# Patient Record
Sex: MALE | Race: WHITE | NOT HISPANIC OR LATINO | Employment: OTHER | URBAN - METROPOLITAN AREA
[De-identification: names, ages, dates, MRNs, and addresses within clinical notes are randomized per-mention and may not be internally consistent; named-entity substitution may affect disease eponyms.]

---

## 2017-05-02 ENCOUNTER — OFFICE VISIT - RIVER FALLS (OUTPATIENT)
Dept: FAMILY MEDICINE | Facility: CLINIC | Age: 52
End: 2017-05-02

## 2017-05-02 ASSESSMENT — MIFFLIN-ST. JEOR: SCORE: 2033.75

## 2018-06-13 ENCOUNTER — OFFICE VISIT - RIVER FALLS (OUTPATIENT)
Dept: FAMILY MEDICINE | Facility: CLINIC | Age: 53
End: 2018-06-13

## 2018-06-13 ASSESSMENT — MIFFLIN-ST. JEOR: SCORE: 1761.59

## 2019-02-14 ENCOUNTER — OFFICE VISIT - RIVER FALLS (OUTPATIENT)
Dept: FAMILY MEDICINE | Facility: CLINIC | Age: 54
End: 2019-02-14

## 2019-02-14 ASSESSMENT — MIFFLIN-ST. JEOR: SCORE: 1648.19

## 2019-02-15 LAB
BUN SERPL-MCNC: 20 MG/DL (ref 7–25)
BUN/CREAT RATIO - HISTORICAL: ABNORMAL (ref 6–22)
CALCIUM SERPL-MCNC: 9.7 MG/DL (ref 8.6–10.3)
CHLORIDE BLD-SCNC: 86 MMOL/L (ref 98–110)
CO2 SERPL-SCNC: 25 MMOL/L (ref 20–32)
CREAT SERPL-MCNC: 0.99 MG/DL (ref 0.7–1.33)
EGFRCR SERPLBLD CKD-EPI 2021: 87 ML/MIN/1.73M2
ERYTHROCYTE [DISTWIDTH] IN BLOOD BY AUTOMATED COUNT: 12.8 % (ref 11–15)
GLUCOSE BLD-MCNC: 757 MG/DL (ref 65–99)
HCT VFR BLD AUTO: 46.1 % (ref 38.5–50)
HGB BLD-MCNC: 15.3 GM/DL (ref 13.2–17.1)
LDLC SERPL CALC-MCNC: 207 MG/DL
MCH RBC QN AUTO: 32.8 PG (ref 27–33)
MCHC RBC AUTO-ENTMCNC: 33.2 GM/DL (ref 32–36)
MCV RBC AUTO: 98.9 FL (ref 80–100)
PLATELET # BLD AUTO: 176 10*3/UL (ref 140–400)
PMV BLD: 11.6 FL (ref 7.5–12.5)
POTASSIUM BLD-SCNC: 4.1 MMOL/L (ref 3.5–5.3)
RBC # BLD AUTO: 4.66 10*6/UL (ref 4.2–5.8)
SODIUM SERPL-SCNC: 126 MMOL/L (ref 135–146)
TSH SERPL DL<=0.005 MIU/L-ACNC: 2.89 MIU/L (ref 0.4–4.5)
WBC # BLD AUTO: 5.2 10*3/UL (ref 3.8–10.8)

## 2019-02-19 ENCOUNTER — OFFICE VISIT - RIVER FALLS (OUTPATIENT)
Dept: FAMILY MEDICINE | Facility: CLINIC | Age: 54
End: 2019-02-19

## 2019-02-19 LAB — GLUCOSE BLDC GLUCOMTR-MCNC: 269 MG/DL

## 2019-02-19 ASSESSMENT — MIFFLIN-ST. JEOR: SCORE: 1648.87

## 2019-02-20 LAB
C PEPTIDE: 0.82 NG/ML (ref 0.8–3.85)
CREAT UR-MCNC: 31 MG/DL (ref 20–320)
HBA1C MFR BLD: >14 %
MICROALBUMIN UR-MCNC: 4.3 MG/DL
MICROALBUMIN/CREAT UR: 139 MG/G{CREAT}

## 2019-02-26 ENCOUNTER — OFFICE VISIT - RIVER FALLS (OUTPATIENT)
Dept: FAMILY MEDICINE | Facility: CLINIC | Age: 54
End: 2019-02-26

## 2019-02-26 ASSESSMENT — MIFFLIN-ST. JEOR: SCORE: 1664.29

## 2019-03-01 LAB
GLUTAMIC ACID DECARBOXYLASE ANTIBODY: <5 IU/ML
IA-2 ANTIBODY: <5.4 UNIT/ML
Lab: <0.4 UNIT/ML

## 2019-04-09 ENCOUNTER — OFFICE VISIT - RIVER FALLS (OUTPATIENT)
Dept: FAMILY MEDICINE | Facility: CLINIC | Age: 54
End: 2019-04-09

## 2019-04-09 ASSESSMENT — MIFFLIN-ST. JEOR: SCORE: 1656.13

## 2019-05-03 ENCOUNTER — COMMUNICATION - RIVER FALLS (OUTPATIENT)
Dept: FAMILY MEDICINE | Facility: CLINIC | Age: 54
End: 2019-05-03

## 2019-05-03 ENCOUNTER — OFFICE VISIT - RIVER FALLS (OUTPATIENT)
Dept: FAMILY MEDICINE | Facility: CLINIC | Age: 54
End: 2019-05-03

## 2019-06-06 ENCOUNTER — COMMUNICATION - RIVER FALLS (OUTPATIENT)
Dept: FAMILY MEDICINE | Facility: CLINIC | Age: 54
End: 2019-06-06

## 2019-06-17 ENCOUNTER — AMBULATORY - RIVER FALLS (OUTPATIENT)
Dept: FAMILY MEDICINE | Facility: CLINIC | Age: 54
End: 2019-06-17

## 2019-06-18 ENCOUNTER — COMMUNICATION - RIVER FALLS (OUTPATIENT)
Dept: FAMILY MEDICINE | Facility: CLINIC | Age: 54
End: 2019-06-18

## 2019-06-18 LAB — HBA1C MFR BLD: 5.8 %

## 2019-06-26 ENCOUNTER — OFFICE VISIT - RIVER FALLS (OUTPATIENT)
Dept: FAMILY MEDICINE | Facility: CLINIC | Age: 54
End: 2019-06-26

## 2019-07-30 ENCOUNTER — OFFICE VISIT - RIVER FALLS (OUTPATIENT)
Dept: FAMILY MEDICINE | Facility: CLINIC | Age: 54
End: 2019-07-30

## 2019-07-30 ASSESSMENT — MIFFLIN-ST. JEOR: SCORE: 1644.34

## 2020-02-12 ENCOUNTER — OFFICE VISIT - RIVER FALLS (OUTPATIENT)
Dept: FAMILY MEDICINE | Facility: CLINIC | Age: 55
End: 2020-02-12

## 2020-02-12 ASSESSMENT — MIFFLIN-ST. JEOR: SCORE: 1748.66

## 2020-02-13 LAB
A/G RATIO - HISTORICAL: 2.5 (ref 1–2.5)
ALBUMIN SERPL-MCNC: 4.9 GM/DL (ref 3.6–5.1)
ALP SERPL-CCNC: 95 UNIT/L (ref 35–144)
ALT SERPL W P-5'-P-CCNC: 57 UNIT/L (ref 9–46)
AST SERPL W P-5'-P-CCNC: 25 UNIT/L (ref 10–35)
BASOPHILS # BLD MANUAL: 53 10*3/UL (ref 0–200)
BASOPHILS NFR BLD MANUAL: 0.6 %
BILIRUB DIRECT SERPL-MCNC: 0.1 MG/DL
BILIRUB INDIRECT SERPL-MCNC: 0.3 MG/DL (ref 0.2–1.2)
BILIRUB SERPL-MCNC: 0.4 MG/DL (ref 0.2–1.2)
BUN SERPL-MCNC: 21 MG/DL (ref 7–25)
BUN/CREAT RATIO - HISTORICAL: ABNORMAL (ref 6–22)
CALCIUM SERPL-MCNC: 10.4 MG/DL (ref 8.6–10.3)
CHLORIDE BLD-SCNC: 104 MMOL/L (ref 98–110)
CO2 SERPL-SCNC: 26 MMOL/L (ref 20–32)
CREAT SERPL-MCNC: 0.97 MG/DL (ref 0.7–1.33)
EGFRCR SERPLBLD CKD-EPI 2021: 88 ML/MIN/1.73M2
EOSINOPHIL # BLD MANUAL: 361 10*3/UL (ref 15–500)
EOSINOPHIL NFR BLD MANUAL: 4.1 %
ERYTHROCYTE [DISTWIDTH] IN BLOOD BY AUTOMATED COUNT: 13.7 % (ref 11–15)
GLOBULIN: 2 (ref 1.9–3.7)
GLUCOSE BLD-MCNC: 95 MG/DL (ref 65–99)
HBA1C MFR BLD: 6.3 %
HCT VFR BLD AUTO: 48.7 % (ref 38.5–50)
HGB BLD-MCNC: 16.7 GM/DL (ref 13.2–17.1)
LDLC SERPL CALC-MCNC: 43 MG/DL
LYMPHOCYTES # BLD MANUAL: 2614 10*3/UL (ref 850–3900)
LYMPHOCYTES NFR BLD MANUAL: 29.7 %
MCH RBC QN AUTO: 31.9 PG (ref 27–33)
MCHC RBC AUTO-ENTMCNC: 34.3 GM/DL (ref 32–36)
MCV RBC AUTO: 92.9 FL (ref 80–100)
MONOCYTES # BLD MANUAL: 915 10*3/UL (ref 200–950)
MONOCYTES NFR BLD MANUAL: 10.4 %
NEUTROPHILS # BLD MANUAL: 4858 10*3/UL (ref 1500–7800)
NEUTROPHILS NFR BLD MANUAL: 55.2 %
PLATELET # BLD AUTO: 184 10*3/UL (ref 140–400)
PMV BLD: 11.4 FL (ref 7.5–12.5)
POTASSIUM BLD-SCNC: 4.5 MMOL/L (ref 3.5–5.3)
PROT SERPL-MCNC: 6.9 GM/DL (ref 6.1–8.1)
RBC # BLD AUTO: 5.24 10*6/UL (ref 4.2–5.8)
SODIUM SERPL-SCNC: 140 MMOL/L (ref 135–146)
WBC # BLD AUTO: 8.8 10*3/UL (ref 3.8–10.8)

## 2020-02-17 ENCOUNTER — COMMUNICATION - RIVER FALLS (OUTPATIENT)
Dept: FAMILY MEDICINE | Facility: CLINIC | Age: 55
End: 2020-02-17

## 2020-11-03 ENCOUNTER — COMMUNICATION - RIVER FALLS (OUTPATIENT)
Dept: FAMILY MEDICINE | Facility: CLINIC | Age: 55
End: 2020-11-03

## 2021-04-01 ENCOUNTER — OFFICE VISIT - RIVER FALLS (OUTPATIENT)
Dept: FAMILY MEDICINE | Facility: CLINIC | Age: 56
End: 2021-04-01

## 2021-04-01 ASSESSMENT — MIFFLIN-ST. JEOR: SCORE: 1770.89

## 2021-04-02 LAB
BUN SERPL-MCNC: 21 MG/DL (ref 7–25)
BUN/CREAT RATIO - HISTORICAL: ABNORMAL (ref 6–22)
CALCIUM SERPL-MCNC: 9.5 MG/DL (ref 8.6–10.3)
CHLORIDE BLD-SCNC: 108 MMOL/L (ref 98–110)
CO2 SERPL-SCNC: 25 MMOL/L (ref 20–32)
CREAT SERPL-MCNC: 0.95 MG/DL (ref 0.7–1.33)
EGFRCR SERPLBLD CKD-EPI 2021: 89 ML/MIN/1.73M2
ERYTHROCYTE [DISTWIDTH] IN BLOOD BY AUTOMATED COUNT: 13.9 % (ref 11–15)
GLUCOSE BLD-MCNC: 106 MG/DL (ref 65–99)
HBA1C MFR BLD: 6.2 %
HCT VFR BLD AUTO: 48.7 % (ref 38.5–50)
HGB BLD-MCNC: 15.9 GM/DL (ref 13.2–17.1)
LDLC SERPL CALC-MCNC: 83 MG/DL
MCH RBC QN AUTO: 30.8 PG (ref 27–33)
MCHC RBC AUTO-ENTMCNC: 32.6 GM/DL (ref 32–36)
MCV RBC AUTO: 94.2 FL (ref 80–100)
PLATELET # BLD AUTO: 171 10*3/UL (ref 140–400)
PMV BLD: 10.7 FL (ref 7.5–12.5)
POTASSIUM BLD-SCNC: 5 MMOL/L (ref 3.5–5.3)
RBC # BLD AUTO: 5.17 10*6/UL (ref 4.2–5.8)
SODIUM SERPL-SCNC: 139 MMOL/L (ref 135–146)
WBC # BLD AUTO: 6 10*3/UL (ref 3.8–10.8)

## 2021-04-05 ENCOUNTER — COMMUNICATION - RIVER FALLS (OUTPATIENT)
Dept: FAMILY MEDICINE | Facility: CLINIC | Age: 56
End: 2021-04-05

## 2022-02-12 VITALS
OXYGEN SATURATION: 94 % | HEIGHT: 70 IN | SYSTOLIC BLOOD PRESSURE: 134 MMHG | HEART RATE: 88 BPM | BODY MASS INDEX: 37.8 KG/M2 | DIASTOLIC BLOOD PRESSURE: 84 MMHG | WEIGHT: 264 LBS

## 2022-02-12 VITALS
HEART RATE: 64 BPM | DIASTOLIC BLOOD PRESSURE: 70 MMHG | SYSTOLIC BLOOD PRESSURE: 114 MMHG | HEART RATE: 76 BPM | HEIGHT: 70 IN | BODY MASS INDEX: 25.62 KG/M2 | DIASTOLIC BLOOD PRESSURE: 101 MMHG | SYSTOLIC BLOOD PRESSURE: 160 MMHG | TEMPERATURE: 96.9 F | HEIGHT: 71 IN | BODY MASS INDEX: 24.84 KG/M2 | WEIGHT: 177.4 LBS | WEIGHT: 179 LBS

## 2022-02-12 VITALS
TEMPERATURE: 97.4 F | HEIGHT: 71 IN | BODY MASS INDEX: 25.51 KG/M2 | WEIGHT: 177.8 LBS | DIASTOLIC BLOOD PRESSURE: 72 MMHG | HEART RATE: 74 BPM | WEIGHT: 176.4 LBS | BODY MASS INDEX: 24.69 KG/M2 | SYSTOLIC BLOOD PRESSURE: 100 MMHG

## 2022-02-12 VITALS
HEIGHT: 71 IN | BODY MASS INDEX: 27.92 KG/M2 | WEIGHT: 199.4 LBS | SYSTOLIC BLOOD PRESSURE: 122 MMHG | HEART RATE: 74 BPM | DIASTOLIC BLOOD PRESSURE: 74 MMHG

## 2022-02-12 VITALS
TEMPERATURE: 97.3 F | WEIGHT: 179 LBS | HEIGHT: 71 IN | BODY MASS INDEX: 25.34 KG/M2 | SYSTOLIC BLOOD PRESSURE: 100 MMHG | HEIGHT: 71 IN | WEIGHT: 180.8 LBS | DIASTOLIC BLOOD PRESSURE: 76 MMHG | BODY MASS INDEX: 25.06 KG/M2 | BODY MASS INDEX: 25.31 KG/M2 | SYSTOLIC BLOOD PRESSURE: 104 MMHG | DIASTOLIC BLOOD PRESSURE: 76 MMHG | HEART RATE: 80 BPM | WEIGHT: 179.12 LBS

## 2022-02-12 VITALS
TEMPERATURE: 97.7 F | HEIGHT: 71 IN | SYSTOLIC BLOOD PRESSURE: 136 MMHG | HEART RATE: 58 BPM | OXYGEN SATURATION: 98 % | BODY MASS INDEX: 28.6 KG/M2 | DIASTOLIC BLOOD PRESSURE: 81 MMHG | WEIGHT: 204.3 LBS

## 2022-02-12 VITALS
DIASTOLIC BLOOD PRESSURE: 62 MMHG | SYSTOLIC BLOOD PRESSURE: 114 MMHG | WEIGHT: 204 LBS | HEART RATE: 78 BPM | HEIGHT: 70 IN | BODY MASS INDEX: 29.2 KG/M2

## 2022-02-15 NOTE — LETTER
(Inserted Image. Unable to display)       November 07, 2019      ANCELMO FOWLER  [address] [place] [address] [address]  GILBERTO LIRIANO [id number]          Dear ANCELMO,      Thank you for selecting [facility] for your healthcare needs.     Our records indicate you are due for the following services:    Non-Fasting Labs    If you had your labs done at another facility or with Direct Access Lab Testing at [facility], please bring in a copy of the results to your next visit, mail a copy, or drop off a copy of your results to your Healthcare Provider.    Diabetic Exam ~ Please bring your glucose meter and/or your blood glucose diary to your appointment.  Hypertension Check ~ Please remember to bring any at-home blood pressure readings with you to your appointment.    You are due for lab work and an office visit; please schedule the lab appointment 1 week before the office visit.  This will assure all results are available to discuss with your Healthcare Provider during your visit.    **It is very helpful if you bring your medication bottles to your appointment.  This assures we have all of your current medications, including strength and dosing information, documented accurately in your medical record.    To schedule an appointment or if you have further questions, please contact your primary clinic:   [facility]       [phone]   [facility]       [phone]              [facility]     [phone]    Powered by [employer]    Sincerely,    Nik Flor MD

## 2022-02-15 NOTE — LETTER
(Inserted Image. Unable to display)   February 17, 2020        ANCELMO FOWLER      [facility] [address]  GILBERTO LIRIANO [id number]        Dear ANCELMO,    Thank you for choosing [facility] for your healthcare needs. Below you will find the results of your recent test(s) done at our clinic.      Your blood sugars are in good control.  Your other tests are all looking OK      Result Name Current Result Previous Result Reference Range   Sodium Level (mmol/L)  140 2/12/2020 ((L)) 126 2/14/2019 135 - 146   Potassium Level (mmol/L)  4.5 2/12/2020  4.1 2/14/2019 3.5 - 5.3   Chloride Level (mmol/L)  104 2/12/2020 ((L)) 86 2/14/2019 98 - 110   CO2 Level (mmol/L)  26 2/12/2020  25 2/14/2019 20 - 32   Glucose Level (mg/dL)  95 2/12/2020 ((H)) 757 2/14/2019 65 - 99   BUN (mg/dL)  21 2/12/2020  20 2/14/2019 7 - 25   Creatinine Level (mg/dL)  0.97 2/12/2020  0.99 2/14/2019 0.70 - 1.33   eGFR (mL/min/1.73m2)  88 2/12/2020  87 2/14/2019 > OR = 60 -    Calcium Level (mg/dL) ((H)) 10.4 2/12/2020  9.7 2/14/2019 8.6 - 10.3   Bilirubin Total (mg/dL)  0.4 2/12/2020  0.2 - 1.2   Bilirubin Direct (mg/dL)  0.1 2/12/2020   - < OR = 0.2   Bilirubin Indirect  0.3 2/12/2020  0.2 - 1.2   Alkaline Phosphatase (unit/L)  95 2/12/2020  35 - 144   AST/SGOT (unit/L)  25 2/12/2020  10 - 35   ALT/SGPT (unit/L) ((H)) 57 2/12/2020  9 - 46   Protein Total (gm/dL)  6.9 2/12/2020  6.1 - 8.1   Albumin Level (gm/dL)  4.9 2/12/2020  3.6 - 5.1   Globulin  2.0 2/12/2020  1.9 - 3.7   A/G Ratio  2.5 2/12/2020  1.0 - 2.5   Hgb A1c ((H)) 6.3 2/12/2020 ((H)) 5.8 6/17/2019  - <5.7   LDL Direct (mg/dL)  43 2/12/2020 ((H)) 207 2/14/2019  - <100   WBC  8.8 2/12/2020  5.2 2/14/2019 3.8 - 10.8   RBC  5.24 2/12/2020  4.66 2/14/2019 4.20 - 5.80   Hgb (gm/dL)  16.7 2/12/2020  15.3 2/14/2019 13.2 - 17.1   Hct (%)  48.7 2/12/2020  46.1 2/14/2019 38.5 - 50.0   MCV (fL)  92.9 2/12/2020  98.9 2/14/2019 80.0 - 100.0   MCH (pg)  31.9 2/12/2020  32.8 2/14/2019 27.0 - 33.0    MCHC (gm/dL)  34.3 2/12/2020  33.2 2/14/2019 32.0 - 36.0   RDW (%)  13.7 2/12/2020  12.8 2/14/2019 11.0 - 15.0   Platelet  184 2/12/2020  176 2/14/2019 140 - 400   MPV (fL)  11.4 2/12/2020  11.6 2/14/2019 7.5 - 12.5   Lymphocytes (%)  29.7 2/12/2020     Abs Lymphocytes  2,614 2/12/2020  850 - 3,900   Neutrophils (%)  55.2 2/12/2020     Abs Neutrophils  4,858 2/12/2020  1,500 - 7,800   Monocytes (%)  10.4 2/12/2020     Abs Monocytes  915 2/12/2020  200 - 950   Eosinophils (%)  4.1 2/12/2020     Abs Eosinophils  361 2/12/2020  15 - 500   Basophils (%)  0.6 2/12/2020     Abs Basophils  53 2/12/2020  0 - 200       Please contact me or my assistant at [phone] if you have any questions or concerns.     Sincerely,        Nik Flor MD        What do your labs mean?  Below is a glossary of commonly ordered labs:  LDL   Bad Cholesterol   HDL   Good Cholesterol  AST/ALT   Liver Function   Cr/Creatinine   Kidney Function  Microalbumin   Kidney Function  BUN   Kidney Function  PSA   Prostate    TSH   Thyroid Hormone  HgbA1c   Diabetes Test   Hgb (Hemoglobin)   Red Blood Cells

## 2022-02-15 NOTE — NURSING NOTE
Asthma Control Test (ACT) Total Entered On:  2/14/2019 8:27 AM CST    Performed On:  2/14/2019 8:27 AM CST by Fabiana Vasquez CMA               Asthma Control Test (ACT) Total   Asthma Control Test Total (Adult) :   19    Fabiana Vasquez CMA - 2/14/2019 8:27 AM CST

## 2022-02-15 NOTE — TELEPHONE ENCOUNTER
---------------------  From: Jeannie Velázquez   To: Fady Rogers MD;     Sent: 3/4/2019 2:06:44 PM CST  Subject: General Message     Ozempic working with amazing results - pt feels much better on it.    125  137  141  108  91  Will discontinue insulin  Continue increasing ozempic weekly dose as directed   Taking metformin as directed

## 2022-02-15 NOTE — TELEPHONE ENCOUNTER
---------------------  From: Fabiana Vasquez CMA   To: Appointment Pool ([id number]);     Sent: 1/13/2021 11:14:11 AM CST  Subject: General Message     Please contact pt to schedule for DM check with fasting lab work. The provider appt can be done as in office or videoattempted calling x1 - no voicemail box to leave [facility] VM box set up- attempt x2

## 2022-02-15 NOTE — PROGRESS NOTES
Patient:   ANCELMO FOWLER JR            MRN: [id number]            FIN: [id number]               Age:   54 years     Sex:  Male     :  1965   Associated Diagnoses:   Type II diabetes mellitus, uncontrolled; HTN (hypertension); Hyperlipidemia; Hoarseness of voice; Alcohol dependence in remission   Author:   Nik Flor MD      Visit Information      Date of Service: 2020 09:40 am  Performing Location: [facility]  Encounter#: [id number]      Primary Care Provider (PCP):  Nik Flor MD    NPTYLER# [id number]      Referring Provider:  Nik Flor MD# [id number]      Chief Complaint   2020 9:43 AM CST    Med check            Additional Information:No additional information recorded during visit.   Chief complaint and symptoms as noted above and confirmed with patient.  Recent lab and diagnostic studies reviewed with patient      History of Present Illness   2019: Presents to clinic at my request for follow-up related to symptomatic hyperglycemia.  He was seen by JUANITO this past week where he shared symptoms of chronic dry mouth probably polyuria and polydipsia.  Lab work at that time demonstrated a blood sugar of nearly 800.  No can.  Her blood sugars since 2015 with mildly elevated blood sugars in the 100s at that time.  He has no known history of diabetes prior to this.  Does not currently have any means of testing supplies or glucometer.  No significant family history of diabetes.  He was prescribed prednisone 20 mg daily this past week related to musculoskeletal complaints.     5/3/2019: Escobar presents to clinic for follow-up.  Originally had been seen approximately 3 months ago related to new onset hyperglycemia with active symptoms.  Since that time has been started on metformin as well as Ozempic which has well-controlled blood sugars with normalization of previous hyperglycemia symptoms.  He currently does not maintain a prescription drug plan and has concerns  "about further cost of therapy.  Also wanting to inquire into resuming his neuro stimulant use.  Previously had been on therapy approximately 4 years ago related to underlying sleep disorder.  Also questions resuming chronic medications which have not been prescribed since 2015.    2/12/20 Med check, no other concerns.   DM -- Takes blood sugar ~1x per week, usually ~. Takes atorvastatin 40mg daily, metformin 100mg 2x daily, and ozembic 1g weekly. Denies low blood sugars in past year. Has recently met with Diabetic educator to review medications. States Ozempic is too expensive and is considering discontinuing if free samples or reduced price not available.  HTN -- At home BP taken ~1x per week, usually ~120/80. Takes Enalapril 20mg 2x daily.   GERD -- Denies any recent epodes in last year, not currently taking any medication.   ASTHMA; Hoarseness -- Well managed with asmanex 1x daily, and ventolin 2x daily. States \"hoarseness always happens\" with ICS. Endorses washing mouth out with water after ICS. Denies hemoptysis, or dysphagia. States smoking history was for 1 year in 1983.   Prior Alcoholism: Has continue to quit for past year.      Review of Systems   Constitutional:  No fever, No chills.    Eye:  No blurring, No double vision.    Ear/Nose/Mouth/Throat:  hoarsevoice.    Respiratory:  No shortness of breath.    Cardiovascular:  No chest pain, No palpitations, No peripheral edema, No syncope.    Gastrointestinal:  No nausea, No vomiting, No abdominal pain.    Genitourinary:  No dysuria, No hematuria.    Hematology/Lymphatics:  Negative except as documented in history of present illness.    Endocrine:  No excessive thirst, No polyuria, No excessive hunger.    Immunologic:  No recurrent fevers.    Musculoskeletal:  Negative, No joint pain, No muscle pain.    Neurologic:  Alert and oriented X4, No numbness, No tingling, No headache.       Health Status   Allergies:    Allergic Reactions (Selected)  No known " allergies   Medications:  (Selected)   Prescriptions  Prescribed  Asmanex  mcg/inh inhalation aerosol: ( 100 mcg ), Inhale, bid, # 2 EA, 0 Refill(s), Type: Maintenance, samples given to patient (Rx)  CONTOUR NEXT EZ MC STRIPS MG APPLY: See Instructions, Instructions: USE TO TEST BLOOD SUGARS ONCE DAILY, # 50 unknown unit, 11 Refill(s), Type: Soft Stop, Pharmacy: [facility], USE TO TEST BLOOD SUGARS ONCE DAILY  Contour Next test strips: Contour Next test strips, See Instructions, Instructions: testing daily, Supply, # 100 EA, 1 Refill(s), Type: Maintenance, Pharmacy: [facility], testing daily  Contour microlet lancets: Contour microlet lancets, See Instructions, Instructions: testing daily, Supply, # 1 box(es), 1 Refill(s), Type: Maintenance, Pharmacy: [facility], testing daily  Ozempic (1 mg dose) 2 mg/1.5 mL subcutaneous solution: ( 0.25 mg ), Subcutaneous, qweek, Instructions: rotate injection sites,   take same day each week, # 2 EA, 4 Refill(s), Type: Maintenance, Pharmacy: [facility]  Ventolin HFA 90 mcg/inh inhalation aerosol: 2 puff(s), inh, q4 hrs, # 1 EA, 11 Refill(s), Type: Soft Stop, Pharmacy: [facility], 2 puff(s) Inhale q4 hrs  atorvastatin 40 mg oral tablet: = 1 tab(s) ( 40 mg ), PO, Daily, # 90 tab(s), 1 Refill(s), Type: Maintenance, Pharmacy: [facility], 1 tab(s) Oral daily  enalapril 20 mg oral tablet: = 1 tab(s) ( 20 mg ), Oral, bid, # 180 tab(s), 1 Refill(s), Type: Maintenance, Pharmacy: [facility], 1 tab(s) Oral bid  metFORMIN 500 mg oral tablet: = 2 tab(s) ( 1,000 mg ), PO, BID, # 360 tab(s), 3 Refill(s), Type: Maintenance, Pharmacy: [facility], 2 tab(s) Oral bid,    Medications          *denotes recorded medication          Contour Next test strips: See Instructions, testing daily, 100 EA, 1 Refill(s).          Contour microlet lancets: See Instructions, testing daily, 1 box(es), 1 Refill(s).          CONTOUR NEXT EZ MC STRIPS MG APPLY: See Instructions, USE TO TEST BLOOD  SUGARS ONCE DAILY, 50 unknown unit, 11 Refill(s).          Ventolin HFA 90 mcg/inh inhalation aerosol: 2 puff(s), inh, q4 hrs, 1 EA, 11 Refill(s).          atorvastatin 40 mg oral tablet: 40 mg, 1 tab(s), PO, Daily, 90 tab(s), 1 Refill(s).          enalapril 20 mg oral tablet: 20 mg, 1 tab(s), Oral, bid, 180 tab(s), 1 Refill(s).          metFORMIN 500 mg oral tablet: 1,000 mg, 2 tab(s), PO, BID, 360 tab(s), 3 Refill(s).          Asmanex  mcg/inh inhalation aerosol: 100 mcg, Inhale, bid, 2 EA, 0 Refill(s).          Ozempic (1 mg dose) 2 mg/1.5 mL subcutaneous solution: 0.25 mg, Subcutaneous, qweek, rotate injection sites,   take same day each week, 2 EA, 4 Refill(s).       Problem list:    All Problems  Attention deficit disorder of adult / SNOMED CT 1967177496 / Confirmed  Alcoholic / SNOMED CT 76345312 / Confirmed  Allergic asthma / SNOMED CT 7889601658 / Confirmed  GERD (gastroesophageal reflux disease) / SNOMED CT 023499756 / Confirmed  Hyperlipidemia / SNOMED CT 80414391 / Confirmed  HTN (hypertension) / SNOMED CT 2498610091 / Confirmed  Obesity / SNOMED CT 9705395582 / Probable  Schizophrenia / SNOMED CT 17934308 / Confirmed  Seasonal allergies / SNOMED CT 676878871 / Confirmed  Tobacco abuse / ICD-9-.1 / Confirmed  Inactive: Depression, recurrent / SNOMED CT 790170942  Resolved: *Hospitalized@[place] - Alcohol dependence      Histories   Past Medical History:    Active  Attention deficit disorder of adult (8798935992)  Seasonal allergies (525131952)  Allergic asthma (0509551474)  HTN (hypertension) (8497428779)  Tobacco abuse (305.1)  Obesity (3718314478)  Schizophrenia (34009494)  GERD (gastroesophageal reflux disease) (322007502)  Hyperlipidemia (99334284)  Resolved  *Hospitalized@[place] - Alcohol dependence: Onset on 2/21/2015 at 49 years.  Resolved on 2/25/2015 at 49 years.   Family History:    Suicide  Grandfather (M)  High blood pressure  Mother  Alcohol abuse  Grandfather (M)      Procedure history:    Appendectomy (105939055) on 11/23/2005 at 40 Years.   Social History:        Alcohol Assessment            Past      Tobacco Assessment            Current, Oral        Physical Examination   vital signs stable, as noted above   Vital Signs   2/12/2020 9:43 AM CST Peripheral Pulse Rate 74 bpm    Systolic Blood Pressure 122 mmHg    Diastolic Blood Pressure 74 mmHg    Mean Arterial Pressure 90 mmHg      Measurements from flowsheet : Measurements   2/12/2020 9:43 AM CST Height Measured - Standard 70.5 in    Weight Measured - Standard 199.4 lb    BSA 2.12 m2    Body Mass Index 28.2 kg/m2  HI      General:  Alert and oriented, No acute distress, reserved affect and holds on to information.    Eye:  Extraocular movements are intact, multiple xanthomata around eyes.    HENT:  Normocephalic, No pharyngeal erythema.    Neck:  Supple, No carotid bruit, No jugular venous distention, No lymphadenopathy, No thyromegaly.    Respiratory:  Lungs are clear to auscultation, Respirations are non-labored.    Cardiovascular:  Normal rate, Regular rhythm, No murmur, No edema.    Gastrointestinal:  Soft, Non-tender, Non-distended, Normal bowel sounds.    Musculoskeletal:  Normal range of motion.    Integumentary:  DM foot exam with full sensation bilaterally. No ulcers or wounds. .    Neurologic:  Alert, Oriented.    Cognition and Speech:  Oriented, Speech clear and coherent.    Psychiatric:  Appropriate mood & affect.       Review / Management   Results review      Impression and Plan   Diagnosis     Type II diabetes mellitus, uncontrolled (LBA38-DG E11.65).     HTN (hypertension) (DUF45-TK I10).     Hyperlipidemia (TRT03-DE E78.5).     Hoarseness of voice (XWX57-CN R49.0).     Alcohol dependence in remission (MSM49-OZ F10.21).     Course:  Progressing as expected.    Orders     Orders (Selected)   Outpatient Orders  Ordered  Return to Clinic (Request): RFV: Med check, Return in 6 months  Ordered (In  Transit)  Basic Metabolic Panel* ([facility]): Specimen Type: Serum, Collection Date: 02/12/20 10:59:00 CST  CBC (includes diff/plt)* ([facility]): Specimen Type: Blood, Collection Date: 02/12/20 10:59:00 CST  Direct LDL* ([facility]): Specimen Type: Serum, Collection Date: 02/12/20 10:59:00 CST  Hemoglobin A1c* ([facility]): Specimen Type: Blood, Collection Date: 02/12/20 10:59:00 CST  Hepatic Function Panel* ([facility]): Specimen Type: Serum, Collection Date: 02/12/20 10:59:00 CST  Prescriptions  Prescribed  Asmanex  mcg/inh inhalation aerosol: ( 100 mcg ), Inhale, bid, # 2 EA, 3 Refill(s), Type: Maintenance, Pharmacy: [facility], 100 mcg Inhale bid  Ozempic (1 mg dose) 2 mg/1.5 mL subcutaneous solution: ( 0.25 mg ), Subcutaneous, qweek, Instructions: rotate injection sites,   take same day each week, # 2 EA, 4 Refill(s), Type: Maintenance, Pharmacy: [facility]  Ventolin HFA 90 mcg/inh inhalation aerosol: 2 puff(s), inh, q4 hrs, # 1 EA, 11 Refill(s), Type: Soft Stop, Pharmacy: [facility], 2 puff(s) Inhale q4 hrs  atorvastatin 40 mg oral tablet: = 1 tab(s) ( 40 mg ), PO, Daily, # 90 tab(s), 1 Refill(s), Type: Maintenance, Pharmacy: [facility], 1 tab(s) Oral daily  enalapril 20 mg oral tablet: = 1 tab(s) ( 20 mg ), Oral, bid, # 180 tab(s), 1 Refill(s), Type: Maintenance, Pharmacy: [facility], 1 tab(s) Oral bid  metFORMIN 500 mg oral tablet: = 2 tab(s) ( 1,000 mg ), PO, BID, # 360 tab(s), 3 Refill(s), Type: Maintenance, Pharmacy: [facility], 2 tab(s) Oral bid.        .) type II DM, controlled - recently diagnosed in 2/2019 with associated hyperglycemia symptoms; HLD  Takes blood sugar ~1x per week, usually ~. (last HbA1c: 5.8% 06/2019 and 14% on 02/19)  insulin therapy: none  microvascular complications: + SREG x1  macrovascular complications: none  -labs as below  -hypoglycemic medications: metformin 1000mg BID, Ozempic 1g qweek   - I question affordability of GLP1 agonist with his current lack of prescription drug  "plan - given good overall control, anticipate him being able to stop Ozempic once Rx lapses  -ASA/ELEANOR/statin:  ASA 81mg daily, enalapril 20mg BID, atorvastatin 40mg qhs    .) Allergic Asthma  Well managed with asmanex 1x daily, and ventolin 2x daily.    .) GERD  Denies any recent epodes in last year, not currently taking any medication.   -Continue to monitor.     .)Hoarseness   Pt states \"hoarseness always happens\" with ICS. Endorses washing mouth out with water after ICS.   No reg flag Sx's such as hemoptysis, or dysphagia.   Consider secondary to steroid. Consider secondary to GERD, adn consider esophageal CA due to prior alcoholism   -Consider stopping ICS for resolution.  -Consider restarting H2 blocker for resolution due to GERD  consider EGD if red flags arise or hoarseness does not resolve after above plan     .)HTN  Today was 122/74, at home is typically ~120/80.   -Takes enalapril 20mg 2x daily.     .) Prior Alcohol Abuse  Continues to abstain  -Labs as below   -Consider AA or naltrexone in future.       .) ADHD, sleep concerns  Pt did not express concern today.  - previously prescribed Adderall; has been evaluated by multiple psychiatrists who do not support any further continued use  "

## 2022-02-15 NOTE — TELEPHONE ENCOUNTER
Entered by Emily Murphy CMA on May 07, 2019 9:01:00 AM CDT  Attempted to contact pt-no answer and no VM set up. Will try again at a later time.      ---------------------  From: Emily Murphy CMA (Care Coordinators Pool ([facility])   To: Emily Murphy CMA;     Sent: 2019 4:32:42 PM CDT  Subject: FW: General Message           ---------------------  From: Fady Rogers MD   To: Care Coordinators Pool ([facility]);     Sent: 2019 12:34:57 PM CDT  Subject: General Message     Can you guys follow up with Escobar.  Specifically needs to be on aspirin 81mg therapy.  Also asked that Sangeeta communicate with him about continued Ozempic samples vs. change to alternative therapy.  He may be able to maintain on metformin monotherapy (no current drug plan).  I've asked he reconnect with RICHARD/JUANITO for neurostimulant use - sounds like used for sleep disorderI talked to Mejia and recommended baby ASA-he agreed to this. He said Ozempic is about 600-700$ per month. I offered samples which at this time declines-says he has enough. He is working on getting drug coverage but cant enroll until the first of the yr. Sangeeta, see BR note-alt for him? I can also look in to rx assistance programs.      Also discussed about seeing JUANITO/RICHARD for sleep concerns-he declines at this time.---------------------  From: Emily Murphy CMA   To: Jeannie Velázquez;     Sent: 2019 2:04:23 PM CDT  Subject: FW: General Message---------------------  From: Jeannie Velázquez   To: Emily Murphy CMA;     Sent: 2019 11:40:24 PM CDT  Subject: RE: General Message     At our last visit pt was not taking Ozempic - pt will stay on metformin and continue to monitor BG, we discussed options but BG well controlled without Ozempic.  Await next a1c and evaulate.     Pt has now weaned off insulin and is not taking Ozempic due to cost (no prescription drug coverage)   BG Testin  133---------------------  From: Emily Murphy CMA   To: Ken MEDEL, Fady;      Sent: 5/9/2019 9:10:18 AM CDT  Subject: FW: General Message     CARSON

## 2022-02-15 NOTE — PROGRESS NOTES
Patient:   ANCELMO FOWLER JR            MRN: [id number]            FIN: [id number]               Age:   54 years     Sex:  Male     :  1965   Associated Diagnoses:   Type II diabetes mellitus, uncontrolled; HTN (hypertension); Hyperlipidemia; Diabetic xanthoma   Author:   Fady Rogers MD      Visit Information      Date of Service: 2019 03:50 pm  Performing Location: [facility]  Encounter#: [id number]      Primary Care Provider (PCP):  Nik Flor MD    NPI# [id number]      Referring Provider:  Fady Rogers MD    NPI# [id number]      Chief Complaint   2019 3:55 PM CDT    1.  f/u labs - chronic  2.  check lesions by eyes - removal              Additional Information:No additional information recorded during visit.   Chief complaint and symptoms as noted above and confirmed with patient.  Recent lab and diagnostic studies reviewed with patient      History of Present Illness   2019: Presents to clinic at my request for follow-up related to symptomatic hyperglycemia.  He was seen by JUANITO this past week where he shared symptoms of chronic dry mouth probably polyuria and polydipsia.  Lab work at that time demonstrated a blood sugar of nearly 800.  No can.  Her blood sugars since 2015 with mildly elevated blood sugars in the 100s at that time.  He has no known history of diabetes prior to this.  Does not currently have any means of testing supplies or glucometer.  No significant family history of diabetes.  He was prescribed prednisone 20 mg daily this past week related to musculoskeletal complaints.     5/3/2019: Escobar presents to clinic for follow-up.  Originally had seen me approximately 3 months ago related to new onset hyperglycemia with active symptoms.  Since that time has been started on metformin as well as Ozempic which has well-controlled blood sugars with normalization of previous hyperglycemia symptoms.  He currently does not maintain a prescription drug plan and has  concerns about further cost of therapy.  Also wanting to inquire into resuming his neuro stimulant use.  Previously had been on therapy approximately 4 years ago related to underlying sleep disorder.  Also questions resuming chronic medications which have not been prescribed since 2015.    6/26/2019: Escobar presents to clinic for follow-up related to his diabetes.  He has been maintained on metformin and Ozempic for his diabetes.  Tolerating therapies well.  Previous symptoms related to diabetes including visual changes, polyuria, polydipsia have fully resolved.  He has seen multiple other providers related to trying to acquire Adderall use.  He has now grown discouraged that no other provider is supportive of his continued use and attributes his poor PHQ-9 scores to this.  Also inquiring into referral to see eye doctor related to evaluation and potential removal of multiple xanthomas along his eye.         Review of Systems   Constitutional:  No fever, No chills.    Eye   Ear/Nose/Mouth/Throat:  Negative except as documented in history of present illness.    Respiratory:  No shortness of breath.    Cardiovascular:  No chest pain, No palpitations, No peripheral edema, No syncope.    Gastrointestinal:  No nausea, No vomiting, No abdominal pain.    Genitourinary:  No dysuria, No hematuria.    Hematology/Lymphatics:  Negative except as documented in history of present illness.    Endocrine:  No excessive thirst, No polyuria, No excessive hunger.    Immunologic:  No recurrent fevers.    Musculoskeletal:  No joint pain, No muscle pain.    Neurologic:  Alert and oriented X4, No numbness, No tingling, No headache.       Health Status   Allergies:    Allergic Reactions (Selected)  No known allergies   Medications:  (Selected)   Prescriptions  Prescribed  CONTOUR NEXT EZ MC STRIPS MG APPLY: See Instructions, Instructions: USE TO TEST BLOOD SUGARS ONCE DAILY, # 50 unknown unit, 11 Refill(s), Type: Soft Stop, Pharmacy: [facility], USE TO TEST BLOOD SUGARS ONCE DAILY  Contour Next test strips: Contour Next test strips, See Instructions, Instructions: testing daily, Supply, # 100 EA, 1 Refill(s), Type: Maintenance, Pharmacy: [facility], testing daily  Contour microlet lancets: Contour microlet lancets, See Instructions, Instructions: testing daily, Supply, # 1 box(es), 1 Refill(s), Type: Maintenance, Pharmacy: [facility], testing daily  Ozempic (1 mg dose) 2 mg/1.5 mL subcutaneous solution: ( 1 mg ), Subcutaneous, qweek, Instructions: rotate injection sites,   take same day each week, # 2 EA, 4 Refill(s), Type: Maintenance, Pharmacy: [facility], 1 mg Subcutaneous qweek,Instr:rotate injection sites, ; take same day each week  Ventolin HFA 90 mcg/inh inhalation aerosol: 2 puff(s), inh, q4 hrs, # 1 EA, 11 Refill(s), Type: Soft Stop, Pharmacy: [facility], 2 puff(s) Inhale q4 hrs  atorvastatin 40 mg oral tablet: = 1 tab(s) ( 40 mg ), PO, Daily, # 90 tab(s), 1 Refill(s), Type: Maintenance, Pharmacy: [facility], 1 tab(s) Oral daily  enalapril 20 mg oral tablet: = 1 tab(s) ( 20 mg ), Oral, bid, # 180 tab(s), 1 Refill(s), Type: Maintenance, Pharmacy: [facility], 1 tab(s) Oral bid  metFORMIN 500 mg oral tablet: = 2 tab(s) ( 1,000 mg ), PO, BID, # 120 tab(s), 3 Refill(s), Type: Maintenance, Pharmacy: [facility], 2 tab(s) Oral bid  Documented Medications  Documented  aspirin 81 mg oral tablet: = 1 tab(s) ( 81 mg ), Oral, daily, 0 Refill(s), Type: Maintenance,    Medications          *denotes recorded medication          Contour Next test strips: See Instructions, testing daily, 100 EA, 1 Refill(s).          Contour microlet lancets: See Instructions, testing daily, 1 box(es), 1 Refill(s).          CONTOUR NEXT EZ MC STRIPS MG APPLY: See Instructions, USE TO TEST BLOOD SUGARS ONCE DAILY, 50 unknown unit, 11 Refill(s).          Ventolin HFA 90 mcg/inh inhalation aerosol: 2 puff(s), inh, q4 hrs, 1 EA, 11 Refill(s).          *aspirin 81 mg  oral tablet: 81 mg, 1 tab(s), Oral, daily, 0 Refill(s).          atorvastatin 40 mg oral tablet: 40 mg, 1 tab(s), PO, Daily, 90 tab(s), 1 Refill(s).          enalapril 20 mg oral tablet: 20 mg, 1 tab(s), Oral, bid, 180 tab(s), 1 Refill(s).          metFORMIN 500 mg oral tablet: 1,000 mg, 2 tab(s), PO, BID, 120 tab(s), 3 Refill(s).          Ozempic (1 mg dose) 2 mg/1.5 mL subcutaneous solution: 1 mg, Subcutaneous, qweek, rotate injection sites,   take same day each week, 2 EA, 4 Refill(s).     Problem list:    All Problems  Attention deficit disorder of adult / SNOMED CT 2781160486 / Confirmed  Alcoholic / SNOMED CT 84590257 / Confirmed  Allergic asthma / SNOMED CT 6239493597 / Confirmed  GERD (gastroesophageal reflux disease) / SNOMED CT 732142455 / Confirmed  Hyperlipidemia / SNOMED CT 43104984 / Confirmed  HTN (hypertension) / SNOMED CT 2183454836 / Confirmed  Obesity / SNOMED CT 5334081259 / Probable  Schizophrenia / SNOMED CT 62908806 / Confirmed  Seasonal allergies / SNOMED CT 536327050 / Confirmed  Tobacco abuse / ICD-9-.1 / Confirmed  Inactive: Depression, recurrent / SNOMED CT 785424109  Resolved: *Hospitalized@[place] - Alcohol dependence      Histories   Past Medical History:    Active  Attention deficit disorder of adult (4904932528)  Seasonal allergies (387979148)  Allergic asthma (2436749961)  HTN (hypertension) (1802225820)  Tobacco abuse (305.1)  Obesity (0723967434)  Schizophrenia (03764128)  GERD (gastroesophageal reflux disease) (836229584)  Hyperlipidemia (93651028)  Resolved  *Hospitalized@[place] - Alcohol dependence: Onset on 2/21/2015 at 49 years.  Resolved on 2/25/2015 at 49 years.   Family History:    Suicide  Grandfather (M)  High blood pressure  Mother  Alcohol abuse  Grandfather (M)     Procedure history:    Appendectomy (415501645) on 11/23/2005 at 40 Years.   Social History:        Alcohol Assessment            Past      Tobacco Assessment            Current, Oral      Physical  Examination   vital signs stable, as noted above   Vital Signs   6/26/2019 3:55 PM CDT Temperature Tympanic 97.4 DegF  LOW    Peripheral Pulse Rate 74 bpm    Systolic Blood Pressure 100 mmHg    Diastolic Blood Pressure 72 mmHg    Mean Arterial Pressure 81 mmHg    BP Site Right arm      Measurements from flowsheet : Measurements   6/26/2019 3:55 PM CDT    Weight Measured - Standard                177.8 lb     General:  Alert and oriented, No acute distress.    Eye:  Extraocular movements are intact, multiple xanthomata around eyes.    HENT:  Normocephalic, No pharyngeal erythema.    Neck:  Supple, No carotid bruit, No jugular venous distention, No lymphadenopathy, No thyromegaly.    Respiratory:  Lungs are clear to auscultation, Respirations are non-labored.    Cardiovascular:  Normal rate, Regular rhythm, No murmur, No edema.    Gastrointestinal:  Soft, Non-tender, Non-distended, Normal bowel sounds.    Musculoskeletal:  Normal range of motion.    Neurologic:  Alert, Oriented.    Cognition and Speech:  Oriented, Speech clear and coherent.    Psychiatric:  Appropriate mood & affect.       Review / Management   Results review:  Lab results: 6/17/2019 2:10 PM CDT    Hgb A1c                   5.8  HI  .       Impression and Plan   Diagnosis     Type II diabetes mellitus, uncontrolled (YKT03-UG E11.65).     HTN (hypertension) (CMQ13-RC I10).     Hyperlipidemia (OQR71-BZ E78.5).     Diabetic xanthoma (JJE76-BK E78.2).        .) type II DM, controlled - recently diagnosed in 2/2019 with associated hyperglycemia symptoms  hypoglycemic medications: metformin 1g BID, Ozempic 1g qweek   - I question affordability of GLP1 agonist with his current lack of prescription drug plan - given good overall control, anticipate him being able to stop Ozempic once Rx lapses  insulin therapy: none  last HbA1c: 5.8%  microvascular complications: + SERG x1  macrovascular complications: none  ASA/ELEANOR/statin:  ASA 81mg daily, enalapril 20mg BID,  atorvastatin 40mg qhs    .) ADHD, sleep concerns  - previously prescribed Adderall; has been evaluated by multiple psychiatrists who do not support any further continued use    .) андрей - he expresses interest in having these removed  - will have him see opho for further assessment.    further f/u through PCP      Professional Services   Counseling Summary:  This was a 25 minute visit with greater than 50% of that time spent counseling the patient.

## 2022-02-15 NOTE — LETTER
(Inserted Image. Unable to display)     March 18, 2019      ANCELMO FOWLER  [address] [place] [address] [place]  [place] [id number]          Dear ANCELMO,      Thank you for selecting [facility] for your healthcare needs.      Our records indicate you are due for the following services:     Clinical Support Staff (CSS)-Only Blood Pressure Check ~ Please stop in anytime to have your blood pressure rechecked. This is a free service and no appointment necessary.     So we can best determine if your medications are effective in lowering your blood pressure, please make sure your blood pressure medicine has been in your system for at least 1-2 hours prior to coming in.  We encourage you to avoid caffeine or other stimulants prior to having your blood pressure checked and come at a time when you are not feeling rushed.     If you check your blood pressure at home, please bring in your blood pressure monitor and home blood pressure readings.  We will check your machine for accuracy and also share your home readings with your Healthcare Provider.       To schedule an appointment or if you have further questions, please contact your primary clinic:   [facility]       [phone]   Critical access hospital       [phone]              [facility]     [phone]      Powered by [employer]    Sincerely,    Nik Flor MD

## 2022-02-15 NOTE — LETTER
(Inserted Image. Unable to display)   February 19, 2019        ANCELMO FOWLER      [facility] [facility] [address]  [place] [id number]        Dear ANCELMO,    Thank you for choosing [facility] for your healthcare needs. Below you will find the results of your recent test(s) done at our clinic.     please come back in.   Your blood sugar is 757 and indicates severe diabetes.       Result Name Current Result Reference Range   Sodium Level (mmol/L) ((L)) 126 2/14/2019 135 - 146   Potassium Level (mmol/L)  4.1 2/14/2019 3.5 - 5.3   Chloride Level (mmol/L) ((L)) 86 2/14/2019 98 - 110   CO2 Level (mmol/L)  25 2/14/2019 20 - 32   Glucose Level (mg/dL) ((H)) 757 2/14/2019 65 - 99   BUN (mg/dL)  20 2/14/2019 7 - 25   Creatinine Level (mg/dL)  0.99 2/14/2019 0.70 - 1.33   eGFR (mL/min/1.73m2)  87 2/14/2019 > OR = 60 -    eGFR  (mL/min/1.73m2)  100 2/14/2019 > OR = 60 -    Calcium Level (mg/dL)  9.7 2/14/2019 8.6 - 10.3   LDL Direct (mg/dL) ((H)) 207 2/14/2019  - <100   TSH (mIU/L)  2.89 2/14/2019 0.40 - 4.50   WBC  5.2 2/14/2019 3.8 - 10.8   RBC  4.66 2/14/2019 4.20 - 5.80   Hgb (gm/dL)  15.3 2/14/2019 13.2 - 17.1   Hct (%)  46.1 2/14/2019 38.5 - 50.0   MCV (fL)  98.9 2/14/2019 80.0 - 100.0   MCH (pg)  32.8 2/14/2019 27.0 - 33.0   MCHC (gm/dL)  33.2 2/14/2019 32.0 - 36.0   RDW (%)  12.8 2/14/2019 11.0 - 15.0   Platelet  176 2/14/2019 140 - 400   MPV (fL)  11.6 2/14/2019 7.5 - 12.5       Please contact me or my assistant at [phone] if you have any questions or concerns.     Sincerely,        Nik Flor MD        What do your labs mean?  Below is a glossary of commonly ordered labs:  LDL   Bad Cholesterol   HDL   Good Cholesterol  AST/ALT   Liver Function   Cr/Creatinine   Kidney Function  Microalbumin   Kidney Function  BUN   Kidney Function  PSA   Prostate    TSH   Thyroid Hormone  HgbA1c   Diabetes Test   Hgb (Hemoglobin)   Red Blood Cells

## 2022-02-15 NOTE — PROGRESS NOTES
Patient:   ANCELMO FOWLER JR            MRN: [id number]            FIN: [id number]               Age:   52 years     Sex:  Male     :  1965   Associated Diagnoses:   Allergic Asthma   Author:   Nik Flor MD      Chief Complaint   2017 8:13 AM CDT     pt here for med refills      History of Present Illness   see chief complaint as noted above and confirmed with the patient      52 year old male presents today for follow up with his allergic asthma, the only medication he is currently using is an albuterol inhaler. He feels his asthma is same as it always has been, he does need to use his albuterol inhaler before activity. He say's he uses the inhaler about four times a day. His act score today in clinic is 19. He is at home , he is not working, he say's he sleeps a lot. He does not excercise much he makes his own meals.       Review of Systems   Constitutional:  No fever.    Respiratory:  No cough.    Cardiovascular:  No chest pain.    Gastrointestinal:  No nausea, No vomiting, No diarrhea.    Integumentary:  No rash.    Neurologic:  No headache.             Health Status   Allergies:    Allergic Reactions (Selected)  No known allergies   Medications:  (Selected)   Prescriptions  Prescribed  Ventolin HFA 90 mcg/inh inhalation aerosol: 2 puff(s), inh, q4 hrs, PRN: as needed for shortness of breath, # 1 EA, 11 Refill(s), Type: Maintenance, Pharmacy: [facility], 2 puff(s) inh q4 hrs,PRN:as needed for shortness of breath   Problem list:    All Problems  Alcoholic / SNOMED CT 87087568 / Confirmed  Allergic Asthma / ICD-9-.90 / Confirmed  Attention Deficit Disorder of Adult / ICD-9-.00 / Confirmed  GERD (gastroesophageal reflux disease) / SNOMED CT 912661787 / Confirmed  HTN (Hypertension) / ICD-9-.9 / Confirmed  Hyperlipidemia / SNOMED CT 18591303 / Confirmed  Obesity / ICD-9-.00 / Probable  Schizophrenia / SNOMED CT 77741710 / Confirmed  Seasonal Allergies / ICD-9-.9 /  Confirmed  Tobacco abuse / ICD-9-.1 / Confirmed  Inactive: Depression, Recurrent / ICD-9-.30  Resolved: *Hospitalized@[place] - Alcohol dependence      Histories   Past Medical History:    Active  Attention Deficit Disorder of Adult (314.00)  Seasonal Allergies (477.9)  Allergic Asthma (493.90)  HTN (Hypertension) (401.9)  Tobacco abuse (305.1)  Schizophrenia (27232700)  GERD (gastroesophageal reflux disease) (335323032)  Hyperlipidemia (78514438)  Resolved  *Hospitalized@[place] - Alcohol dependence: Onset on 2/21/2015 at 49 years.  Resolved on 2/25/2015 at 49 years.   Family History:    Suicide  Grandfather (M)  High blood pressure  Mother  Alcohol abuse  Grandfather (M)     Procedure history:    Appendectomy (556756153) on 11/23/2005 at 40 Years.   Social History:        Alcohol Assessment: Denies Alcohol Use      Tobacco Assessment: Current            Current, Oral        Physical Examination   Vital Signs   5/2/2017 8:13 AM CDT Peripheral Pulse Rate 88 bpm    Pulse Site Radial artery    Systolic Blood Pressure 134 mmHg    Diastolic Blood Pressure 84 mmHg    Mean Arterial Pressure 101 mmHg    BP Site Right arm    Oxygen Saturation 94 %      Measurements from flowsheet : Measurements   5/2/2017 8:13 AM CDT Height Measured - Standard 70 in    Weight Measured - Standard 264 lb    BSA 2.43 m2    Body Mass Index 37.88 kg/m2    Ht/Wt Measurement Refused by Patient? No      General:  Alert and oriented, No acute distress.    Eye:  Pupils are equal, round and reactive to light, Normal conjunctiva.    HENT:  Oral mucosa is moist.    Neck:  Supple.    Respiratory:  Lungs are clear to auscultation, Respirations are non-labored.    Cardiovascular:  Normal rate, Regular rhythm, No edema.    Gastrointestinal:  Non-distended.    Musculoskeletal:  Normal gait.    Integumentary:  Warm, No rash.    Psychiatric:  Cooperative, Appropriate mood & affect, Normal judgment.       Review / Management   Results review       Impression and Plan       Diagnosis     Allergic Asthma (GSL43-CT J45.909).     Course:  Progressing as expected, patient resistant to any alternative medications or treatments  declines other health issue help.    Plan:  Will refill the albuterol.     Due for labs and Colonoscopy can come in for discussion of this.     Please return in one year for next follow up visit. .    Summary:  He is due for Colonoscopy and labs, he declines at this time, may schedule in the near future. .    I, Carolyn Ordaz Medical Assistant acted solely as a scribe for, and in presence of Dr. Nik Flor who performed the services.

## 2022-02-15 NOTE — NURSING NOTE
Comprehensive Intake Entered On:  5/3/2019 3:07 PM CDT    Performed On:  5/3/2019 3:04 PM CDT by Kaylene Streeter CMA               Summary   Chief Complaint :   f/u dm - BS's have been running high 90's-100's.  discuss going back on some other meds including for BP,, chol and ADDmed    Advance Directive :   Yes   Weight Measured :   179.12 lb(Converted to: 179 lb 2 oz, 81.25 kg)    Systolic Blood Pressure :   100 mmHg   Diastolic Blood Pressure :   76 mmHg   Mean Arterial Pressure :   84 mmHg   Peripheral Pulse Rate :   80 bpm   Temperature Tympanic :   97.3 DegF(Converted to: 36.3 DegC)  (LOW)    Kaylene Streeter CMA - 5/3/2019 3:04 PM CDT   Health Status   Allergies Verified? :   Yes   Medication History Verified? :   Yes   Immunizations Current :   Yes   Medical History Verified? :   Yes   Pre-Visit Planning Status :   Completed   Tobacco Use? :   Current every day smoker   Kaylene Streeter CMA - 5/3/2019 3:04 PM CDT   Social History   Social History   (As Of: 5/3/2019 3:07:58 PM CDT)   Tobacco:        Current, Oral   (Last Updated: 7/30/2015 8:44:38 AM CDT by Fabiana Vasquez CMA)

## 2022-02-15 NOTE — PROGRESS NOTES
Patient:   ANCELMO FOWLER JR            MRN: [id number]            FIN: [id number]               Age:   53 years     Sex:  Male     :  1965   Associated Diagnoses:   None   Author:   Jeannie Velázquez      Doctor: Basia   Patient Information  (Medicare and address information is on file)  Medicare HICN#: _  Address:_ City:_ State:_ Zip:_  Home Phone:_ Work Phone:_ Other Contact Phone:_    Diabetes self-management training (DSMT) and medical nutrition therapy (MNT) are individual and complementary services to improve diabetes care. For Medicare beneficiaries, both services can be ordered in the same year. Research indicates MNT combined with DSMT improves outcomes.    Diabetes Self-Managment Training (DSMT)  Medicare: 10 hours initial DSMT in 12 month period, plus 2 hours follow-up annually  *Check type of training services and number of hours requested:  X Initial DSMT:  X 10 hours or _ no. hrs. requested  _ Follow-up DSMT: _ 2 hours or _ no. hrs. requested  _ Additional insulin training: _ no. hrs. requested    *Patients with special needs requiring individual DSMT  Check all special needs that apply:  _ Vision _ Hearing  _ Physical  _Cognitive Impairment  _ Language limitations X Other  No classes offered    *DSMT Content  X All ten content areas, as appropriate  _ Monitoring diabetes    _ Diabetes as disease process  _ Psychological adjustment _ Physical activity  _ Nutritional management  _ Goal setting, problem solving  _ Medications       _ Prevent, detect and treat acute complications  _ Preconception/pregnancy _ Prevent, detect and teat chronic complications     management or gestational     diabetes management    Medical Nutrition Therapy (MNT)  Medicare: 3 hours initial MNT in the first calendar year, plus two hours follow-up MNT annually. Additional MNT hours available for change in medical condition, treament and/or diagnosis.  *Check the type of MNT and/or number of additional hours requested:  X   Initial MNT:   _ Annual follow-up MNT  _ Additional MNT services in the same calendar year, per RD recommendations  _ number of additional hours requested    Please specify change in medical condition, treatment and/or diagnosis      *Diagnosis  Please send recent labs for patient eligibility & outcomes monitoring  _ Type 1 uncontrolled  _ Type 1 controlled  X Type 2 uncontrolled  _ Type 2 controlled  _ Gestational diabetes _ Other _    Complications/Comorbidities  Check all that apply:  X Hypertension   X Dyslipidemia   _ Nephropathy   _ Stroke  _ Neuropathy   _ Retinopathy  _ Renal disease   _ CHD  _ PVD      _ Pregnancy  _ Non-healing wound _ Obesity    _ Mental/affective disorder  _ Other _    Current Diabetes Medications  Specify type, dose and frequency  Oral: metformin 500mg ii tabs BID - working up to goal dose  Insulin: Basaglar 14u   Ozempic working up to 1mg goal dose   Patient now uses: X Pen _ Needle _ Pump    Patient Behavior Goals/Plan of Care    To gradually lose weight and improve blood sugar control.  Minimize the risk for hypoglycemia and reduce risks of developing complications related to uncontrolled diabetes.    Signature and UPIN #: (UPIN and NPI are on file)  Group/Practice name, address and phone: [facility], [address] [facility]  [phone]

## 2022-02-15 NOTE — PROGRESS NOTES
Patient:   ANCELMO FOWLER JR            MRN: [id number]            FIN: [id number]               Age:   53 years     Sex:  Male     :  1965   Associated Diagnoses:   Type II diabetes mellitus, uncontrolled   Author:   Fady Rogers MD      Visit Information      Date of Service: 2019 08:31 am  Performing Location: [facility]  Encounter#: [id number]      Primary Care Provider (PCP):  Nik Flor MD    NPI# [id number]      Referring Provider:  Fady Rogers MD    NPI# [id number]      Chief Complaint   2019 8:40 AM CST    Review labs - High blood sugar              Additional Information:No additional information recorded during visit.   Chief complaint and symptoms as noted above and confirmed with patient.  Recent lab and diagnostic studies reviewed with patient      History of Present Illness   2019: Presents to clinic at my request for follow-up related to symptomatic hyperglycemia.  He was seen by JUANITO this past week where he shared symptoms of chronic dry mouth probably polyuria and polydipsia.  Lab work at that time demonstrated a blood sugar of nearly 800.  No can.  Her blood sugars since  with mildly elevated blood sugars in the 100s at that time.  He has no known history of diabetes prior to this.  Does not currently have any means of testing supplies or glucometer.  No significant family history of diabetes.  He was prescribed prednisone 20 mg daily this past week related to musculoskeletal complaints.          Review of Systems   Constitutional:  weight loss, No fever, No chills.    Eye:  Visual disturbances.    Ear/Nose/Mouth/Throat:  Negative except as documented in history of present illness.    Respiratory:  No shortness of breath.    Cardiovascular:  No chest pain, No palpitations, No peripheral edema, No syncope.    Gastrointestinal:  No nausea, No vomiting, No abdominal pain.    Genitourinary:  No dysuria, No hematuria.    Hematology/Lymphatics:  Negative  except as documented in history of present illness.    Endocrine:  Excessive thirst, Polyuria, Excessive hunger.    Immunologic:  No recurrent fevers.    Musculoskeletal:  No joint pain, No muscle pain.    Neurologic:  Alert and oriented X4, No numbness, No tingling, No headache.       Health Status   Allergies:    Allergic Reactions (Selected)  No known allergies   Medications:  (Selected)   Prescriptions  Prescribed  Ventolin HFA 90 mcg/inh inhalation aerosol: 2 puff(s), inh, q4 hrs, # 1 EA, 11 Refill(s), Type: Soft Stop, Pharmacy: [facility], 2 puff(s) Inhale q4 hrs  metFORMIN 500 mg oral tablet: = 2 tab(s) ( 1,000 mg ), PO, BID, # 120 tab(s), 3 Refill(s), Type: Maintenance, Pharmacy: [facility], 2 tab(s) Oral bid  Documented Medications  Documented  NexIUM: Oral, daily, PRN: for dyspepsia, 0 Refill(s), Type: Maintenance,    Medications          *denotes recorded medication          Ventolin HFA 90 mcg/inh inhalation aerosol: 2 puff(s), inh, q4 hrs, 1 EA, 11 Refill(s).          *NexIUM: Oral, daily, PRN: for dyspepsia, 0 Refill(s).          metFORMIN 500 mg oral tablet: 1,000 mg, 2 tab(s), PO, BID, 120 tab(s), 3 Refill(s).     Problem list:    All Problems  Attention deficit disorder of adult / SNOMED CT 7575408270 / Confirmed  Alcoholic / SNOMED CT 59269140 / Confirmed  Allergic asthma / SNOMED CT 9466175895 / Confirmed  GERD (gastroesophageal reflux disease) / SNOMED CT 083981523 / Confirmed  Hyperlipidemia / SNOMED CT 48162921 / Confirmed  HTN (hypertension) / SNOMED CT 2897377372 / Confirmed  Obesity / SNOMED CT 3843429476 / Probable  Schizophrenia / SNOMED CT 70432073 / Confirmed  Seasonal allergies / SNOMED CT 905057867 / Confirmed  Tobacco abuse / ICD-9-.1 / Confirmed  Inactive: Depression, recurrent / SNOMED CT 787926236  Resolved: *Hospitalized@[facility] - Alcohol dependence      Histories   Past Medical History:    Active  Attention deficit disorder of adult (5392709188)  Seasonal allergies  (712391072)  Allergic asthma (2697166439)  HTN (hypertension) (4029551501)  Tobacco abuse (305.1)  Obesity (1563181158)  Schizophrenia (34891163)  GERD (gastroesophageal reflux disease) (372035723)  Hyperlipidemia (33057791)  Resolved  *Hospitalized@[facility] - Alcohol dependence: Onset on 2/21/2015 at 49 years.  Resolved on 2/25/2015 at 49 years.   Family History:    Suicide  Grandfather (M)  High blood pressure  Mother  Alcohol abuse  Grandfather (M)     Procedure history:    Appendectomy (416094463) on 11/23/2005 at 40 Years.   Social History:        Tobacco Assessment            Current, Oral      Physical Examination   vital signs stable, as noted above   Vital Signs   2/19/2019 8:40 AM CST Temperature Tympanic 96.9 DegF  LOW    Peripheral Pulse Rate 76 bpm    Systolic Blood Pressure 170 mmHg  HI    Diastolic Blood Pressure 102 mmHg  HI    Mean Arterial Pressure 125 mmHg    BP Systolic Repeat 160 mmHg    BP Diastolic Repeat 101 mmHg      Measurements from flowsheet : Measurements   2/19/2019 8:40 AM CST Height Measured - Standard 70.5 in    Weight Measured - Standard 177.4 lb    BSA 2 m2    Body Mass Index 25.09 kg/m2  HI      General:  Alert and oriented, No acute distress.    Eye:  Extraocular movements are intact.    HENT:  Normocephalic, No pharyngeal erythema, dry mouth.    Neck:  Supple, No carotid bruit, No jugular venous distention, No lymphadenopathy, No thyromegaly.    Respiratory:  Lungs are clear to auscultation, Respirations are non-labored.    Cardiovascular:  Normal rate, Regular rhythm, No murmur, No edema.    Gastrointestinal:  Soft, Non-tender, Non-distended, Normal bowel sounds.    Musculoskeletal:  Normal range of motion.    Neurologic:  Alert, Oriented.    Cognition and Speech:  Oriented, Speech clear and coherent.    Psychiatric:  Appropriate mood & affect.       Review / Management   Results review:  Lab results   2/19/2019 9:03 AM CST Blood Glucose, Capillary 269 mg/dL    2/14/2019 8:19  AM CST Sodium Level 126 mmol/L  LOW (Modified)    Potassium Level 4.1 mmol/L (Modified)    Chloride Level 86 mmol/L  LOW (Modified)    CO2 Level 25 mmol/L (Modified)    Glucose Level 757 mg/dL  HI (Modified)    BUN 20 mg/dL (Modified)    Creatinine 0.99 mg/dL (Modified)    BUN/Creat Ratio NOT APPLICABLE (Modified)    eGFR 87 mL/min/1.73m2 (Modified)    eGFR African American 100 mL/min/1.73m2 (Modified)    Calcium Level 9.7 mg/dL (Modified)    LDL Direct 207 mg/dL  HI (Modified)    TSH 2.89 mIU/L (Modified)    WBC 5.2 (Modified)    RBC 4.66 (Modified)    Hgb 15.3 gm/dL (Modified)    Hct 46.1 % (Modified)    MCV 98.9 fL (Modified)    MCH 32.8 pg (Modified)    MCHC 33.2 gm/dL (Modified)    RDW 12.8 % (Modified)    Platelet 176 (Modified)    MPV 11.6 fL (Modified)   .       Impression and Plan   Diagnosis     Type II diabetes mellitus, uncontrolled (XVC12-NU E11.65).     - newly diagnosed with recent blood sugar of 757; compounded by high fructose corn syrup intake  - active symptoms  - accucheck in clinic today was 269  - briefly introduced to our diabetic educator.  Further education and dietary education in near future  - starting on metformin 1000mg BID  - starting on basal insulin (I anticipate this will be short-term)  - given degree of weight loss, will check C-peptide level      Professional Services   Counseling Summary:  This was a 25 minute visit with greater than 50% of that time spent counseling the patient.

## 2022-02-15 NOTE — NURSING NOTE
Quick Intake Entered On:  7/30/2019 4:38 PM CDT    Performed On:  7/30/2019 4:38 PM CDT by Jeannie Velázquez               Summary   Advance Directive :   Yes   Weight Measured :   176.4 lb(Converted to: 176 lb 6 oz, 80.01 kg)    Height Measured :   70.5 in(Converted to: 5 ft 10 in, 179.07 cm)    Body Mass Index :   24.95 kg/m2   Body Surface Area :   1.99 m2   Jeannie Velázquez - 7/30/2019 4:38 PM CDT

## 2022-02-15 NOTE — NURSING NOTE
Quick Intake Entered On:  4/9/2019 8:35 AM CDT    Performed On:  4/9/2019 8:35 AM CDT by Jeannie Veláqzuez               Summary   Advance Directive :   Yes   Weight Measured :   179 lb(Converted to: 179 lb 0 oz, 81.19 kg)    Height Measured :   70.5 in(Converted to: 5 ft 10 in, 179.07 cm)    Body Mass Index :   25.32 kg/m2 (HI)    Body Surface Area :   2.01 m2   Systolic Blood Pressure :   118 mmHg   Diastolic Blood Pressure :   87 mmHg (HI)    Mean Arterial Pressure :   97 mmHg   Jeannie Velázquez - 4/9/2019 8:35 AM CDT

## 2022-02-15 NOTE — TELEPHONE ENCOUNTER
---------------------  From: Krissy BRARCeleste   Sent: 3/31/2020 3:07:58 PM CDT  Subject: ACT f/u     Spoke to pt on the phone and ACT completed as below. Recent visit with ZIM and asthma discussed.     1) In the past 4 weeks, how much of the time did your asthma keep you from getting as much done at work, school or at home?   1 = All of the time   2 = Most of the time   3 = Some of the time   4 = A little of the time    5 = None of the time  2)  During the past 4 weeks, how often have you had shortness of breath?   1 = More than once a day   2 = Once a day   3 = 3 to 6 times a week   4 = Once or twice a week   5 = Not at all  3) During the past 4 weeks, how often did your asthma symptoms (wheezing, coughing, shortness of breath, chest tightness or pain) wake you up at night or earlier than usual in the morning?   1 = 4 or more nights a week   2 = 2 or 3 nights a week   3 = Once a week   4 = Once or twice   5 = Not at all  4) During the past 4 weeks, how often have you used your rescue inhaler or nebulizer medication (such as albuterol)?   1 = 3 or more times per day   2 = 1 or 2 times per day   3 = 2 or 3 times per week   4 = Once a week or less   5 = Not at all  5) How would you rate your asthma control during the past 4 weeks?   1 = Not controlled at all   2 = Poorly controlled   3 = Somewhat controlled   4 = Well controlled   5 = Completely controlled    If your score is 19 or less, your asthma may not be under control. Be sure to talk with your doctor about your results. The answers below should not be added to your total score. These answers should be discussed with your doctor.    In the past 12 months, how many emergency department visits have you had due to asthma (that did not result in a hospitalization)?  _none   In the past 12 months, how many inpatient hospitalizations have you had due to asthma?  _none____

## 2022-02-15 NOTE — TELEPHONE ENCOUNTER
Entered by Rica Machado CMA on November 03, 2020 1:06:25 PM CST  ---------------------  From: Rica Machado CMA   To: [facility]    Sent: 11/3/2020 1:06:25 PM CST  Subject: Medication Management     ** Submitted: **  Order:enalapril (enalapril 20 mg oral tablet)  1 tab(s)  Oral  bid  Qty:  60 tab(s)        Refills:  0          Substitutions Allowed     Route To Pharmacy - [facility]    Signed by Rica Machado CMA  11/3/2020 7:05:00 PM [facility]    ** Submitted: **  Complete:enalapril (enalapril 20 mg oral tablet)   Signed by Rica Machado CMA  11/3/2020 7:06:00 PM [facility]    ** Not Approved:  **  enalapril (ENALAPRIL MALEATE 20MG TABLET)  TAKE ONE TABLET BY MOUTH TWICE DAILY  Qty:  180 EA        Days Supply:  90        Refills:  1          Substitutions Allowed     Route To [facility]   Signed by Rica Machado CMA            ** Patient matched by Rica Machado CMA on 11/3/2020 1:04:25 PM CST **      ------------------------------------------  From: [employer]  To: Nik Flor MD  Sent: November 3, 2020 10:19:02 AM CST  Subject: Medication Management  Due: October 31, 2020 3:13:38 PM CDT     ** On Hold Pending Signature **     Drug: enalapril (enalapril 20 mg oral tablet), TAKE ONE TABLET BY MOUTH TWICE DAILY  Quantity: 180 EA  Days Supply: 90  Refills: 1  Substitutions Allowed  Notes from Pharmacy:     Dispensed Drug: enalapril (enalapril 20 mg oral tablet), TAKE ONE TABLET BY MOUTH TWICE DAILY  Quantity: 180 EA  Days Supply: 90  Refills: 1  Substitutions Allowed  Notes from Pharmacy:  ------------------------------------------2/12/20 med f/u  2/12/20 labs    Overdue for appt - was due in August.  Protocol fill sent

## 2022-02-15 NOTE — LETTER
(Inserted Image. Unable to display)   March 01, 2019      ANCELMO MALCOLM  [address] [place] [address] [place]  GILBERTO LIRIANO [phone]        Dear ANCELMO,     Thank you for selecting [facility] ([facility]viously [facility] for your healthcare needs. Below you will find the results of your recent test(s) done at our clinic.     No clear elevation in auto-antibody levels to support autoimmune case (type I diabetes) as cause of your high blood sugars.  We will need to see how you respond to non-insulin types of medications to see if you are more of a type II diabetic.        Result Name Current Result Previous Result Reference Range   Hgb A1c ((H)) >14.0 2/19/2019   - <5.7   C-Peptide (ng/mL)  0.82 2/19/2019  0.80 - 3.85   U Microalbumin (mg/dL)  4.3 2/19/2019  See Note: -    Ur Creatinine (mg/dL)  31 2/19/2019  20 - 320   Ur Microalbumin/Creatinine Ratio ((H)) 139 2/19/2019   - <30   Glutamic Acid Decarboxylase (DENISE) (IU/mL)  <5 2/19/2019   - <5   Insulin Ab (unit/mL)  <0.4 2/19/2019   - <0.4   IA-2 Antibody (unit/mL)  <5.4 2/19/2019   - <5.4   Test in Question - Test(s) Ordered   GAD65, IA-2, AND INSULIN AUTO 2/19/2019     Misc Test CPT Code   [id number] 2/19/2019     Misc Test Client Contact  KALYN MILLER 2/19/2019     Misc Test Comment  See comment 2/19/2019  See comment 2/19/2019    Misc Test Comment  See comment 2/19/2019  See comment 2/19/2019        Please contact me or my assistant at [phone] if you have any questions or concerns.     Sincerely,        Fady Rogers MD    What do your labs mean?  Below is a glossary of commonly ordered labs:  LDL - Bad Cholesterol  HDL - Good Cholesterol  AST/ALT - Liver Function  Cr/Creatinine - Kidney Function  Microalbumin - Kidney Function  BUN - Kidney Function  PSA - Prostate   TSH - Thyroid Hormone  HgbA1c - Diabetes Test  Hgb (Hemoglobin) - Red Blood Cells

## 2022-02-15 NOTE — PROGRESS NOTES
Patient:   ANCELMO FOWLER JR            MRN: [id number]            FIN: [id number]               Age:   53 years     Sex:  Male     :  1965   Associated Diagnoses:   Allergic asthma; Rhomboid pain; Dry mouth; Numbness of fingers; Obesity; Tobacco abuse   Author:   Nik Flor MD      Chief Complaint   2019 7:57 AM CST    1. Med check 2. c/o right shoulder blade pain x 3 months 3. numbness in middle, ring and pinky finger of left hand x 1 month      History of Present Illness   see chief complaint as noted above and confirmed with the patient   53 year old male presenting for medicaiton check up,    Asthma: currently taking albuteral as needed for symptoms. He notices tightening about 3-4 times a day. He stays inside most of the time but does go out to eat and get groceries at times. He doesn't notice any issues with his breathing with the cold weather. Has noticed his mouth has been dry for the last year and he is drinking more water.    Back pain: Started 3 months ago. Pain is located on right side around the shoulder blade.    He has constant numbness/ odd sensation in his left middle, ring and pinky finger. This started 1 month ago.      Review of Systems   Constitutional:  No fever, No chills, No fatigue.    Ear/Nose/Mouth/Throat:  dry mouth, No nasal congestion, No sore throat.    Respiratory:  No shortness of breath, No cough, No wheezing.    Cardiovascular:  No chest pain.    Gastrointestinal:  No nausea, No vomiting.    Musculoskeletal:       Back pain: On the right side, In the upper region.    Integumentary:  No rash.    Neurologic:  Numbness, No headache.    Psychiatric:  Negative.              Health Status   Allergies:    Allergic Reactions (Selected)  No known allergies   Medications:  (Selected)   Prescriptions  Prescribed  Ventolin HFA 90 mcg/inh inhalation aerosol: 2 puff(s), inh, q4 hrs, # 1 EA, 10 Refill(s), Type: Soft Stop, Pharmacy: [facility], 2 puff(s) inh q4 hrs   Problem  list:    All Problems  Tobacco abuse / ICD-9-.1 / Confirmed  Seasonal allergies / SNOMED CT 700814174 / Confirmed  Schizophrenia / SNOMED CT 75853183 / Confirmed  Obesity / SNOMED CT 3114371212 / Probable  HTN (hypertension) / SNOMED CT 3326776760 / Confirmed  Hyperlipidemia / SNOMED CT 05022148 / Confirmed  GERD (gastroesophageal reflux disease) / SNOMED CT 557797137 / Confirmed  Allergic asthma / SNOMED CT 0225807720 / Confirmed  Alcoholic / SNOMED CT 05021417 / Confirmed  Attention deficit disorder of adult / SNOMED CT 8523297524 / Confirmed  Inactive: Depression, recurrent / SNOMED CT 284641558  Resolved: *Hospitalized@[facility] - Alcohol dependence      Histories   Past Medical History:    Active  Attention deficit disorder of adult (5578617316)  Seasonal allergies (369094156)  Allergic asthma (9991436950)  HTN (hypertension) (2875022468)  Tobacco abuse (305.1)  Obesity (7440841901)  Schizophrenia (68844405)  GERD (gastroesophageal reflux disease) (293195913)  Hyperlipidemia (27957384)  Resolved  *Hospitalized@[facility] - Alcohol dependence: Onset on 2/21/2015 at 49 years.  Resolved on 2/25/2015 at 49 years.   Family History:    Suicide  Grandfather (M)  High blood pressure  Mother  Alcohol abuse  Grandfather (M)     Procedure history:    Appendectomy (944570929) on 11/23/2005 at 40 Years.   Social History:        Tobacco Assessment            Current, Oral      Physical Examination   Vital Signs   2/14/2019 7:57 AM CST Peripheral Pulse Rate 64 bpm    Systolic Blood Pressure 114 mmHg    Diastolic Blood Pressure 70 mmHg    Mean Arterial Pressure 85 mmHg      Measurements from flowsheet : Measurements   2/14/2019 7:57 AM CST Height Measured - Standard 70 in    Weight Measured - Standard 179.0 lb    BSA 2 m2    Body Mass Index 25.68 kg/m2  HI      General:  Alert and oriented, No acute distress.    Eye:  Pupils are equal, round and reactive to light, Normal conjunctiva.    HENT:  Oral mucosa is moist.    Neck:   Supple.    Respiratory:  Respirations are non-labored.    Cardiovascular:  Normal rate, Regular rhythm, No edema.    Gastrointestinal:  Non-distended.    Musculoskeletal:  Normal gait.    Integumentary:  Warm, No rash.    Psychiatric:  Cooperative, Appropriate mood & affect, Normal judgment.       Review / Management   Results review      Impression and Plan   Diagnosis     Allergic asthma (RJQ72-WY J45.909).     Rhomboid pain (IPV33-VT M79.18).     Dry mouth (EZL35-AS R68.2).     Numbness of fingers (XNN88-ZP R20.0).     Obesity (IIH59-JB E66.9).     Tobacco abuse (FBU43-FG Z72.0).     Plan:  Refilled albuterol for the next year. Discussed other medication options but he is not interested in any other options.  Massage and stretching will help with Rhomboid muscle pain. Offered EMG to pinpoint where the numbness is coming from but he declinies. Will send in Prednsone 20mg to see if that will help. Will send to lab for chem 8, cbc, tsh, and direct ldl. declines pneumonia vaccine. Follow up in 1 year or sooner if any more concerns.  I, Arabella Vasquez [facility], acted solely as a scribe for, and in the presence of Dr. Nik Flor who performed the service..

## 2022-02-15 NOTE — PROGRESS NOTES
Patient:   ANCELMO FOWLER JR            MRN: [id number]            FIN: [id number]               Age:   54 years     Sex:  Male     :  1965   Associated Diagnoses:   Diabetes mellitus type II, uncontrolled; Hyperlipidemia; HTN (hypertension)   Author:   Jeannie Velázquez      Visit Information   Visit type:  Diabetes Self Management Education .    Referral source:  Nik Flor MD.       Chief Complaint   Type II diabetes       History of Present Illness   Pt briefly seen by CDE for insulin, diet and BG monitoring instruction during MD appointment on 19 due to hyperglycemia.    Pt has now weaned off insulin and is not taking Ozempic due to cost (no prescription drug coverage) did use sample pens and was at the 0.50mg weekly dose.   Discussed nutrition, diabetes, and lifestyle management with pt  Nutrition:  Pt has been following a carb controlled diet eating mostly protein and vegetables, some fruit and dairy and starch but less than prior to dx   Fluids: water, cut out regular soda, will have some diet soda  Physical Activity:  no routine at this time   Stress:   mod  Sleep: fair  Support: family, friends  BG Testin - 133  DM related medication:   metformin 500mg ii tabs BID - tolerating well and taking as directed   Routine diabetes care:    Eye exam completed 3/6/19  Skin: intact  Dental: due   Feet: monofilament test completed with MD   Immunizations: will need pneumonia vaccine,   Hgb A1c: >14% = >355 mg/dL estimated average glucose - recommended to have fasting labs 2019      Health Status   Allergies:    Allergic Reactions (Selected)  No known allergies   Medications:  (Selected)   Prescriptions  Prescribed  Contour Next test strips: Contour Next test strips, See Instructions, Instructions: testing daily, Supply, # 100 EA, 1 Refill(s), Type: Maintenance, Pharmacy: [facility], testing daily  Contour microlet lancets: Contour microlet lancets, See Instructions, Instructions: testing daily,  Supply, # 1 box(es), 1 Refill(s), Type: Maintenance, Pharmacy: [facility], testing daily  Ozempic (1 mg dose) 2 mg/1.5 mL subcutaneous solution: ( 1 mg ), Subcutaneous, qweek, Instructions: rotate injection sites,   take same day each week, # 2 EA, 4 Refill(s), Type: Maintenance, Pharmacy: [facility], 1 mg Subcutaneous qweek,Instr:rotate injection sites, ; take same day each week  Ventolin HFA 90 mcg/inh inhalation aerosol: 2 puff(s), inh, q4 hrs, # 1 EA, 11 Refill(s), Type: Soft Stop, Pharmacy: [facility], 2 puff(s) Inhale q4 hrs  metFORMIN 500 mg oral tablet: = 2 tab(s) ( 1,000 mg ), PO, BID, # 120 tab(s), 3 Refill(s), Type: Maintenance, Pharmacy: [facility], 2 tab(s) Oral bid  Documented Medications  Documented  NexIUM: Oral, daily, PRN: for dyspepsia, 0 Refill(s), Type: Maintenance,    Medications          *denotes recorded medication          Contour Next test strips: See Instructions, testing daily, 100 EA, 1 Refill(s).          Contour microlet lancets: See Instructions, testing daily, 1 box(es), 1 Refill(s).          Ventolin HFA 90 mcg/inh inhalation aerosol: 2 puff(s), inh, q4 hrs, 1 EA, 11 Refill(s).          *NexIUM: Oral, daily, PRN: for dyspepsia, 0 Refill(s).          metFORMIN 500 mg oral tablet: 1,000 mg, 2 tab(s), PO, BID, 120 tab(s), 3 Refill(s).          Ozempic (1 mg dose) 2 mg/1.5 mL subcutaneous solution: 1 mg, Subcutaneous, qweek, rotate injection sites,   take same day each week, 2 EA, 4 Refill(s).   Problem list:    All Problems  Attention deficit disorder of adult / SNOMED CT 8760055086 / Confirmed  Alcoholic / SNOMED CT 05086185 / Confirmed  Allergic asthma / SNOMED CT 5904767278 / Confirmed  GERD (gastroesophageal reflux disease) / SNOMED CT 786696445 / Confirmed  Hyperlipidemia / SNOMED CT 79933355 / Confirmed  HTN (hypertension) / SNOMED CT 9997391120 / Confirmed  Obesity / SNOMED CT 7238616664 / Probable  Schizophrenia / SNOMED CT 63355973 / Confirmed  Seasonal allergies /  SNOMED CT 294921015 / Confirmed  Tobacco abuse / ICD-9-.1 / Confirmed  Inactive: Depression, recurrent / SNOMED CT 646452927  Resolved: *Hospitalized@[place] - Alcohol dependence      Histories   Past Medical History:    Active  Attention deficit disorder of adult (3182902403)  Seasonal allergies (585388961)  Allergic asthma (6024557798)  HTN (hypertension) (1064831437)  Tobacco abuse (305.1)  Obesity (3935336285)  Schizophrenia (29307677)  GERD (gastroesophageal reflux disease) (533600669)  Hyperlipidemia (21563855)  Resolved  *Hospitalized@[place] - Alcohol dependence: Onset on 2/21/2015 at 49 years.  Resolved on 2/25/2015 at 49 years.   Family History:    Suicide  Grandfather (M)  High blood pressure  Mother  Alcohol abuse  Grandfather (M)     Procedure history:    Appendectomy (SNOMED CT 072983727) on 11/23/2005 at 40 Years.   Social History:        Tobacco Assessment            Current, Oral      Physical Examination   VS/Measurements      Health Maintenance      Recommendations     Pending (in the next year)        OverDue           Asthma - Spirometry due  02/16/17  and every 1  year(s)           Influenza Vaccine due  11/06/18  and every 1  year(s)        Due            Alcohol Misuse Screen (Male) due  04/11/19  and every 1  year(s)           Aspirin Therapy for Prevention of CVD (Male) due  04/11/19  and every 5  year(s)           Asthma - Assessment due  04/11/19  and every 1  year(s)           Colorectal Cancer Screen (Colonoscopy) (Male) due  04/11/19  Variable frequency           Colorectal Cancer Screen (Occult Blood) (Male) due  04/11/19  Variable frequency           Colorectal Cancer Screen (Sigmoidoscopy) (Male) due  04/11/19  Variable frequency           Depression Screen (Male) due  04/11/19  and every 1  year(s)           Hepatitis C Screen 2562-7925 (Male) due  04/11/19  One-time only        Due In Future            Tetanus Vaccine not due until  11/01/19  and every 10  year(s)            Lipid Disorders Screen (Male) not due until  02/14/20  and every 1  year(s)           Type 2 Diabetes Mellitus Screen (Male) not due until  02/19/20  and every 1  year(s)           Body Mass Index Check (Male) not due until  04/09/20  and every 1  year(s)           Obesity Screen and Counseling (Male) not due until  04/09/20  and every 1  year(s)           High Blood Pressure Screen (Male) not due until  04/09/20  and every 1  year(s)     Satisfied (in the past 1 year)        Satisfied            Body Mass Index Check (Male) on  04/09/19.           Body Mass Index Check (Male) on  02/26/19.           Body Mass Index Check (Male) on  02/19/19.           Body Mass Index Check (Male) on  02/14/19.           Body Mass Index Check (Male) on  06/13/18.           High Blood Pressure Screen (Male) on  04/09/19.           High Blood Pressure Screen (Male) on  04/09/19.           High Blood Pressure Screen (Male) on  02/19/19.           High Blood Pressure Screen (Male) on  02/19/19.           High Blood Pressure Screen (Male) on  02/14/19.           High Blood Pressure Screen (Male) on  06/13/18.           Lipid Disorders Screen (Male) on  02/14/19.           Obesity Screen and Counseling (Male) on  04/09/19.           Obesity Screen and Counseling (Male) on  02/26/19.           Obesity Screen and Counseling (Male) on  02/19/19.           Obesity Screen and Counseling (Male) on  02/14/19.           Obesity Screen and Counseling (Male) on  06/13/18.           Type 2 Diabetes Mellitus Screen (Male) on  02/19/19.      Review / Management   Results review:  Lab results   2/19/2019 10:04 AM CST Hgb A1c >14.0 (Modified)    C-Peptide 0.82 ng/mL (Modified)    U Microalbumin 4.3 mg/dL (Modified)    Ur Creatinine 31 mg/dL (Modified)    Ur Microalb/Creat Ratio 139  HI (Modified)    Glutamic Acid Decarboxylase (DENISE) <5 IU/mL     Insulin Ab <0.4 unit/mL     IA-2 Antibody <5.4 unit/mL     Test in Question - Test(s) Ordered  GAD65, IA-2,  AND INSULIN AUTO     Misc Test CPT Code  [id number]     Misc Test Client Contact KALYN MILLRE     Misc Test Comment See comment     Misc Test Comment See comment    2/19/2019 9:03 AM CST Blood Glucose, Capillary 269 mg/dL    2/14/2019 8:19 AM CST Sodium Level 126 mmol/L  LOW (Modified)    Potassium Level 4.1 mmol/L (Modified)    Chloride Level 86 mmol/L  LOW (Modified)    CO2 Level 25 mmol/L (Modified)    Glucose Level 757 mg/dL  HI (Modified)    BUN 20 mg/dL (Modified)    Creatinine 0.99 mg/dL (Modified)    BUN/Creat Ratio NOT APPLICABLE (Modified)    eGFR 87 mL/min/1.73m2 (Modified)    eGFR African American 100 mL/min/1.73m2 (Modified)    Calcium Level 9.7 mg/dL (Modified)    LDL Direct 207 mg/dL  HI (Modified)    TSH 2.89 mIU/L (Modified)    WBC 5.2 (Modified)    RBC 4.66 (Modified)    Hgb 15.3 gm/dL (Modified)    Hct 46.1 % (Modified)    MCV 98.9 fL (Modified)    MCH 32.8 pg (Modified)    MCHC 33.2 gm/dL (Modified)    RDW 12.8 % (Modified)    Platelet 176 (Modified)    MPV 11.6 fL (Modified)   .       Impression and Plan   Diagnosis     Diabetes mellitus type II, uncontrolled (YTQ50-QJ E11.65).     Hyperlipidemia (VXQ60-DQ E78.5).     HTN (hypertension) (WEL92-DE I10).       Counseled: Patient, AADE7 Self Care Behaviors   Today the patient was provided with a review and further instruction on the progression of diabetes mellitus, prevention of heart disease, prevention of complications, and lifestyle guidelines.  Healthy Eating - review of carbohydrate counting, reading food labels, appropriate portion sizes, well balanced meals, diabetic plate method as a general rule.  Patient is provided with additional ideas to incorporate dietary recommendations, increase meal planning and preparation.  Mindful eating, finding other coping mechanisms besides food  Instructed on Therapeutic Lifestyle Changes (TLC) nutrition plan for heart healthy eating.     Low cholesterol (<200mg), low fat, low saturated fat, zero trans  fat   Low sodium (2000mg)   High fiber diet (20 - 30gm); Soluble fiber 10+ gm/ day    Eat more omega 3 fats  Vegetarian at least 1 day per week  Being Active - Education on how exercise helps with :    - lowering BG by increasing the muscles ability to take up and use glucose   - weight loss   - healthier heart (improve lipid profile)   - improve sleep, mood, energy   - decrease stress  Monitoring -  Reviewed recommended testing schedule and blood glucose target levels.    Taking Medication - Will provide a sample of Ozempic and pt will stay on 0.25mg weekly and assess BG - likely able to d/c after the sample pen is out   Problem Solving - medical support team assistance, resources provided (written and apps, internet); good control of stress  Healthy Coping - support of friends, family, and medical support team   Reducing Risks - Education on importance of good glucose control to help reduce the potential for developing microvascular and macrovascular complications associated with high blood glucose over time.    Education on the following complications    *heart attack/cardiovascular disease - prevention with heart healthy diet, daily activity, and weight control   *retinopathy - annual eye exam   *nephropathy - discussion on annual or prn kidney function labs with urinalysis    *stroke - maintaining recommended glucose control   *peripheral arterial disease - regular activity, maintaining recommended glucose control   *neuropathy - monofilament testing, regular activity, maintaining recommended glucose control  Appropriate foot care education  Vaccinations as recommended influenza, pneumonia etc.   Routine dental appt.'s for prevention of periodontal diseases   Importance of adequate sleep   Good skin care, monitor for any open areas, sores, or cuts.      Pt able to verbalize understanding of above education, handouts provided for additional resources.    Goals:   1.  Practice healthy stress management and get  good quality sleep with the goal of 7-8 hours per night.    2.   Physical activity: Recommend increasing to 2 hrs and 30 min a week (150 minutes) of moderate-intensity, or 1 hr and 15 min (75 minutes) a week of vigorous-intensity aerobic physical activity, or an equivalent combination of moderate- and vigorous-intensity aerobic physical activity.  Aerobic activity should be performed in episodes of at least 10 minutes, preferably spread throughout the week.  Muscle-strengthening activities on 2 or more days per week.    3.  Eat in a healthy way, per diabetic plate method.  Nutrition reference: Eat 3 meals a day; snacks are optional.  A meal is three or more food groups; make it colorful for better nutrition.    4.  Total Carbohydrate intake 30 grams for meals, snacks ~ 15 grams  5.  Pt to monitor BG BID.  BG goals: Fasting and before meals 80 - 120 mg/dL, 2 hrs after the start of a meal 80 - 140 mg/dL.    6.  Goal weight 170  7.  Read handouts provided.  F/u in 3 months  8.  Continue with Metformin 500mg ii tabs BID and sample of Ozempic 0.25mg weekly        Professional Services   Time spent with pt 60 min   cc Dr. Flor

## 2022-02-15 NOTE — NURSING NOTE
Comprehensive Intake Entered On:  4/1/2021 9:53 AM CDT    Performed On:  4/1/2021 9:46 AM CDT by Brayden ALONSO, Roopa               Summary   Chief Complaint :   HTN f/u, refill meds.   Advance Directive :   Yes   Weight Measured :   204.3 lb(Converted to: 204 lb 5 oz, 92.669 kg)    Height Measured :   70.5 in(Converted to: 5 ft 10 in, 179.07 cm)    Body Mass Index :   28.9 kg/m2 (HI)    Body Surface Area :   2.14 m2   Systolic Blood Pressure :   136 mmHg (HI)    Diastolic Blood Pressure :   81 mmHg (HI)    Mean Arterial Pressure :   99 mmHg   Peripheral Pulse Rate :   58 bpm (LOW)    BP Site :   Right arm   Pulse Site :   Radial artery   BP Method :   Electronic   HR Method :   Electronic   Temperature Temporal :   97.7 DegF(Converted to: 36.5 DegC)    Oxygen Saturation :   98 %   Roopa Owen MA - 4/1/2021 9:46 AM CDT   Health Status   Allergies Verified? :   Yes   Medication History Verified? :   Yes   Immunizations Current :   Yes   Medical History Verified? :   Yes   Pre-Visit Planning Status :   Completed   Tobacco Use? :   Former smoker   Roopa Owen MA - 4/1/2021 9:46 AM CDT   Meds / Allergies   (As Of: 4/1/2021 9:53:07 AM CDT)   Allergies (Active)   No known allergies  Estimated Onset Date:   Unspecified ; Created By:   Carina Bruno; Reaction Status:   Active ; Category:   Drug ; Substance:   No known allergies ; Type:   Allergy ; Updated By:   Carina Bruno; Source:   Paper Chart ; Reviewed Date:   2/26/2019 4:45 PM CST        Medication List   (As Of: 4/1/2021 9:53:07 AM CDT)   Prescription/Discharge Order    albuterol  :   albuterol ; Status:   Prescribed ; Ordered As Mnemonic:   Ventolin HFA 90 mcg/inh inhalation aerosol ; Simple Display Line:   2 puff(s), inh, q4 hrs, 1 EA, 11 Refill(s) ; Ordering Provider:   Nik Flor MD; Catalog Code:   albuterol ; Order Dt/Tm:   2/12/2020 11:00:30 AM CST          atorvastatin  :   atorvastatin ; Status:   Prescribed ; Ordered As Mnemonic:    atorvastatin 40 mg oral tablet ; Simple Display Line:   40 mg, 1 tab(s), PO, Daily, 90 tab(s), 1 Refill(s) ; Ordering Provider:   Nik Flor MD; Catalog Code:   atorvastatin ; Order Dt/Tm:   2/12/2020 11:00:35 AM CST          enalapril  :   enalapril ; Status:   Prescribed ; Ordered As Mnemonic:   enalapril 20 mg oral tablet ; Simple Display Line:   20 mg, 1 tab(s), Oral, bid, 60 tab(s), 0 Refill(s) ; Ordering Provider:   Nik Flor MD; Catalog Code:   enalapril ; Order Dt/Tm:   11/3/2020 1:05:52 PM CST          metFORMIN  :   metFORMIN ; Status:   Prescribed ; Ordered As Mnemonic:   metFORMIN 500 mg oral tablet ; Simple Display Line:   1,000 mg, 2 tab(s), PO, BID, 360 tab(s), 3 Refill(s) ; Ordering Provider:   Nik Flor MD; Catalog Code:   metFORMIN ; Order Dt/Tm:   2/12/2020 11:00:47 AM CST          Miscellaneous Prescription  :   Miscellaneous Prescription ; Status:   Prescribed ; Ordered As Mnemonic:   Contour microlet lancets ; Simple Display Line:   See Instructions, testing daily, 1 box(es), 1 Refill(s) ; Ordering Provider:   Nik Flor MD; Catalog Code:   Miscellaneous Prescription ; Order Dt/Tm:   2/19/2019 4:35:29 PM CST          Miscellaneous Prescription  :   Miscellaneous Prescription ; Status:   Prescribed ; Ordered As Mnemonic:   CONTOUR NEXT EZ MC STRIPS MG APPLY ; Simple Display Line:   See Instructions, USE TO TEST BLOOD SUGARS ONCE DAILY, 50 unknown unit, 0 Refill(s) ; Ordering Provider:   Nik Flor MD; Catalog Code:   Miscellaneous Prescription ; Order Dt/Tm:   8/28/2020 3:05:30 PM CDT          Miscellaneous Prescription  :   Miscellaneous Prescription ; Status:   Prescribed ; Ordered As Mnemonic:   CONTOUR NEXT EZ MC STRIPS MG APPLY ; Simple Display Line:   See Instructions, USE TO TEST BLOOD SUGARS ONCE DAILY, 50 EA, 0 Refill(s) ; Ordering Provider:   Nik Flor MD; Catalog Code:   Miscellaneous Prescription ; Order Dt/Tm:   8/27/2020 8:41:19 AM CDT           Misc Prescription  :   Misc Prescription ; Status:   Prescribed ; Ordered As Mnemonic:   CONTOUR NEXT EZ MC STRIPS MG APPLY ; Simple Display Line:   See Instructions, USE TO TEST BLOOD SUGARS ONCE DAILY, 50 unknown unit, 11 Refill(s) ; Ordering Provider:   Nik Flor MD; Catalog Code:   Miscellaneous Prescription ; Order Dt/Tm:   6/6/2019 2:45:28 PM CDT          Miscellaneous Prescription  :   Miscellaneous Prescription ; Status:   Prescribed ; Ordered As Mnemonic:   Contour Next test strips ; Simple Display Line:   See Instructions, testing daily, 100 EA, 1 Refill(s) ; Ordering Provider:   Nik Flor MD; Catalog Code:   Miscellaneous Prescription ; Order Dt/Tm:   2/19/2019 4:34:38 PM CST          mometasone  :   mometasone ; Status:   Prescribed ; Ordered As Mnemonic:   Asmanex  mcg/inh inhalation aerosol ; Simple Display Line:   100 mcg, Inhale, bid, 2 EA, 3 Refill(s) ; Ordering Provider:   Nik Flor MD; Catalog Code:   mometasone ; Order Dt/Tm:   2/12/2020 11:01:00 AM CST          semaglutide  :   semaglutide ; Status:   Prescribed ; Ordered As Mnemonic:   Ozempic (1 mg dose) 2 mg/1.5 mL subcutaneous solution ; Simple Display Line:   0.25 mg, Subcutaneous, qweek, rotate injection sites,   take same day each week, 2 EA, 4 Refill(s) ; Ordering Provider:   Fady Rogers MD; Catalog Code:   semaglutide ; Order Dt/Tm:   2/26/2019 1:02:42 PM CST            ID Risk Screen   Recent Travel History :   No recent travel   Family Member Travel History :   No recent travel   Other Exposure to Infectious Disease :   Unknown   COVID-19 Testing Status :   No COVID-19 test performed   Roopa Owen MA - 4/1/2021 9:46 AM CDT   Social History   Social History   (As Of: 4/1/2021 9:53:07 AM CDT)   Alcohol:        Past   (Last Updated: 5/7/2019 12:04:58 PM CDT by Kaylene Streeter CMA)          Tobacco:        Former smoker, quit more than 30 days ago   (Last Updated: 4/1/2021 9:50:54 AM CDT by  Roopa Owen MA)          Electronic Cigarette/Vaping:        Electronic Cigarette Use: Never.   (Last Updated: 4/1/2021 9:51:03 AM CDT by Roopa Owen MA)

## 2022-02-15 NOTE — NURSING NOTE
Quick Intake Entered On:  4/9/2019 8:38 AM CDT    Performed On:  4/9/2019 8:37 AM CDT by Jeannie Velázquez               Summary   Advance Directive :   Yes   Systolic Blood Pressure :   104 mmHg   Diastolic Blood Pressure :   76 mmHg   Mean Arterial Pressure :   85 mmHg   Jeannie Velázquez - 4/9/2019 8:37 AM CDT

## 2022-02-15 NOTE — PROGRESS NOTES
Patient:   ANCELMO FOWLER JR            MRN: [id number]            FIN: [id number]               Age:   53 years     Sex:  Male     :  1965   Associated Diagnoses:   Diabetes mellitus type II, uncontrolled; Hyperlipidemia; HTN (hypertension)   Author:   Jeannie Velázquez      Visit Information   Visit type:  Diabetes Self Management Education .    Referral source:  Nik Flor MD.       Chief Complaint   DM uncontrolled      History of Present Illness   Pt briefly seen by CDE for insulin, diet and BG monitoring instruction during MD appointment on 19 due to hyperglycemia.    Pt currently taking 14u Basaglar and is instructed on Ozempic today starting at 0.25mg x 4 weeks, 0.5mg x 2 weeks, then rx for 1mg weekly injection.    Discussed nutrition, diabetes, and lifestyle management with pt  Nutrition:  Pt has been following a carb controlled diet over the past week mostly protein and vegetables  Fluids: water, cut out regular soda, will have some diet soda  Physical Activity:  no routine at this time   Stress:   mod  Sleep: fair  Support: family, friends  BG Testing: q am   270  255  283  298  231  326 (had caramel corn)  266  178 this am   DM related medication:   Insulin: Basaglar 14u, metformin increasing as directed 500mg ii tabs BID   Routine diabetes care:  will discuss during follow up consults    Skin: intact  Dental:   Feet: monofilament test completed with MD   Immunizations:   Hgb A1c: >14% = >355 mg/dL estimated average glucose      Health Status   Allergies:    Allergic Reactions (Selected)  No known allergies   Medications:  (Selected)   Prescriptions  Prescribed  Contour Next test strips: Contour Next test strips, See Instructions, Instructions: testing daily, Supply, # 100 EA, 1 Refill(s), Type: Maintenance, Pharmacy: [facility], testing daily  Contour microlet lancets: Contour microlet lancets, See Instructions, Instructions: testing daily, Supply, # 1 box(es), 1 Refill(s), Type:  Maintenance, Pharmacy: [facility], testing daily  Ozempic (1 mg dose) 2 mg/1.5 mL subcutaneous solution: ( 1 mg ), Subcutaneous, qweek, Instructions: rotate injection sites,   take same day each week, # 2 EA, 4 Refill(s), Type: Maintenance, Pharmacy: [facility], 1 mg Subcutaneous qweek,Instr:rotate injection sites, ; take same day each week  Ventolin HFA 90 mcg/inh inhalation aerosol: 2 puff(s), inh, q4 hrs, # 1 EA, 11 Refill(s), Type: Soft Stop, Pharmacy: [facility], 2 puff(s) Inhale q4 hrs  metFORMIN 500 mg oral tablet: = 2 tab(s) ( 1,000 mg ), PO, BID, # 120 tab(s), 3 Refill(s), Type: Maintenance, Pharmacy: [facility], 2 tab(s) Oral bid  Documented Medications  Documented  Basaglar KwikPen: See Instructions, Instructions: 14u increasing as directed, # 15 mL, 2 Refill(s), Type: Maintenance  NexIUM: Oral, daily, PRN: for dyspepsia, 0 Refill(s), Type: Maintenance,    Medications          *denotes recorded medication          Contour Next test strips: See Instructions, testing daily, 100 EA, 1 Refill(s).          Contour microlet lancets: See Instructions, testing daily, 1 box(es), 1 Refill(s).          Ventolin HFA 90 mcg/inh inhalation aerosol: 2 puff(s), inh, q4 hrs, 1 EA, 11 Refill(s).          *NexIUM: Oral, daily, PRN: for dyspepsia, 0 Refill(s).          *Basaglar KwikPen: See Instructions, 14u increasing as directed, 15 mL, 2 Refill(s).          metFORMIN 500 mg oral tablet: 1,000 mg, 2 tab(s), PO, BID, 120 tab(s), 3 Refill(s).          Ozempic (1 mg dose) 2 mg/1.5 mL subcutaneous solution: 1 mg, Subcutaneous, qweek, rotate injection sites,   take same day each week, 2 EA, 4 Refill(s).   Problem list:    All Problems  Attention deficit disorder of adult / SNOMED CT 1145728637 / Confirmed  Alcoholic / SNOMED CT 58467656 / Confirmed  Allergic asthma / SNOMED CT 8322672658 / Confirmed  GERD (gastroesophageal reflux disease) / SNOMED CT 005226800 / Confirmed  Hyperlipidemia / SNOMED CT 42307245 /  Confirmed  HTN (hypertension) / SNOMED CT 3323926076 / Confirmed  Obesity / SNOMED CT 6962558173 / Probable  Schizophrenia / SNOMED CT 98792181 / Confirmed  Seasonal allergies / SNOMED CT 634521218 / Confirmed  Tobacco abuse / ICD-9-.1 / Confirmed  Inactive: Depression, recurrent / SNOMED CT 123550494  Resolved: *Hospitalized@[place] - Alcohol dependence      Histories   Past Medical History:    Active  Attention deficit disorder of adult (0567597636)  Seasonal allergies (999880021)  Allergic asthma (1799314628)  HTN (hypertension) (9317823670)  Tobacco abuse (305.1)  Obesity (4950164013)  Schizophrenia (36172323)  GERD (gastroesophageal reflux disease) (720516146)  Hyperlipidemia (90268191)  Resolved  *Hospitalized@[place] - Alcohol dependence: Onset on 2/21/2015 at 49 years.  Resolved on 2/25/2015 at 49 years.   Family History:    Suicide  Grandfather (M)  High blood pressure  Mother  Alcohol abuse  Grandfather (M)     Procedure history:    Appendectomy (SNOMED CT 902857310) on 11/23/2005 at 40 Years.   Social History:        Tobacco Assessment            Current, Oral      Physical Examination   Measurements from flowsheet : Measurements   2/26/2019 1:05 PM CST Height Measured - Standard 70.5 in    Weight Measured - Standard 180.8 lb    BSA 2.02 m2    Body Mass Index 25.57 kg/m2  HI         Health Maintenance      Recommendations     Pending (in the next year)        OverDue           Asthma - Spirometry due  02/16/17  and every 1  year(s)           Influenza Vaccine due  11/06/18  and every 1  year(s)        Due            Alcohol Misuse Screen (Male) due  02/26/19  and every 1  year(s)           Aspirin Therapy for Prevention of CVD (Male) due  02/26/19  and every 5  year(s)           Asthma - Assessment due  02/26/19  and every 1  year(s)           Colorectal Cancer Screen (Colonoscopy) (Male) due  02/26/19  Variable frequency           Colorectal Cancer Screen (Occult Blood) (Male) due  02/26/19   Variable frequency           Colorectal Cancer Screen (Sigmoidoscopy) (Male) due  02/26/19  Variable frequency           Depression Screen (Male) due  02/26/19  and every 1  year(s)           Hepatitis C Screen 2911-7114 (Male) due  02/26/19  One-time only        Due In Future            Tetanus Vaccine not due until  11/01/19  and every 10  year(s)           Lipid Disorders Screen (Male) not due until  02/14/20  and every 1  year(s)           High Blood Pressure Screen (Male) not due until  02/19/20  and every 1  year(s)           Type 2 Diabetes Mellitus Screen (Male) not due until  02/19/20  and every 1  year(s)     Satisfied (in the past 1 year)        Satisfied            Body Mass Index Check (Male) on  02/26/19.           Body Mass Index Check (Male) on  02/19/19.           Body Mass Index Check (Male) on  02/14/19.           Body Mass Index Check (Male) on  06/13/18.           High Blood Pressure Screen (Male) on  02/19/19.           High Blood Pressure Screen (Male) on  02/19/19.           High Blood Pressure Screen (Male) on  02/14/19.           High Blood Pressure Screen (Male) on  06/13/18.           Lipid Disorders Screen (Male) on  02/14/19.           Obesity Screen and Counseling (Male) on  02/26/19.           Obesity Screen and Counseling (Male) on  02/19/19.           Obesity Screen and Counseling (Male) on  02/14/19.           Obesity Screen and Counseling (Male) on  06/13/18.           Type 2 Diabetes Mellitus Screen (Male) on  02/19/19.      Review / Management   Results review:  Lab results   2/19/2019 10:04 AM CST Hgb A1c >14.0     C-Peptide 0.82 ng/mL     U Microalbumin 4.3 mg/dL     Ur Creatinine 31 mg/dL     Ur Microalb/Creat Ratio 139  HI    2/19/2019 9:03 AM CST Blood Glucose, Capillary 269 mg/dL    2/14/2019 8:19 AM CST Sodium Level 126 mmol/L  LOW (Modified)    Potassium Level 4.1 mmol/L (Modified)    Chloride Level 86 mmol/L  LOW (Modified)    CO2 Level 25 mmol/L (Modified)    Glucose  Level 757 mg/dL  HI (Modified)    BUN 20 mg/dL (Modified)    Creatinine 0.99 mg/dL (Modified)    BUN/Creat Ratio NOT APPLICABLE (Modified)    eGFR 87 mL/min/1.73m2 (Modified)    eGFR African American 100 mL/min/1.73m2 (Modified)    Calcium Level 9.7 mg/dL (Modified)    LDL Direct 207 mg/dL  HI (Modified)    TSH 2.89 mIU/L (Modified)    WBC 5.2 (Modified)    RBC 4.66 (Modified)    Hgb 15.3 gm/dL (Modified)    Hct 46.1 % (Modified)    MCV 98.9 fL (Modified)    MCH 32.8 pg (Modified)    MCHC 33.2 gm/dL (Modified)    RDW 12.8 % (Modified)    Platelet 176 (Modified)    MPV 11.6 fL (Modified)   .       Impression and Plan   Diagnosis     Diabetes mellitus type II, uncontrolled (WJR04-JB E11.65).     Hyperlipidemia (GAN62-DI E78.5).     HTN (hypertension) (UIO65-HI I10).       Counseled: PatientBENJIE Self Care Behaviors   Today the patient was instructed on the basic physiology of diabetes mellitus, BG testing, and lifestyle guidelines.  Healthy Eating - Education on what foods are primary sources of carbohydrates and how intake affects BG readings, edu on carbohydrate counting, reading food labels, appropriate portion sizes, well balanced meals, diabetic plate method as a general rule.  Patient is provided with numerous ideas to incorporate dietary recommendations, meal planning and preparation, mindful eating techniques and weight loss tips.    Being Active - Education on how exercise helps with :    - lowering BG by increasing the muscles ability to take up and use glucose   - weight loss   - healthier heart (improve lipid profile)   - improve sleep, mood, energy   - decrease stress      Monitoring - Pt is instructed on the recommended testing schedule and blood glucose target levels.  The patient is able to return appropriate demonstration of the use of the meter.  Pt is instructed on proper disposal of lancets.    Taking Medication - Education on the 8 core defects of type II diabetes and how the different classes  of medications have different primary physiological actions.  Discussed gradual progression of diabetes and potential to add medication in the future.    Today the patient is instructed on Ozempic: proper use, mechanism of action, indications, dosing, injection schedule, safety and side effects.  Pt is shown a demonstration of a injection and was able to successfully demonstrate accurate injection technique without difficulty.  Patient was provided with 1 mo of samples and startup package for references.  Reviewed s/sx of hypoglycemia and treatment protocol.     Problem Solving - medical support team assistance, resources provided (written and apps, internet); good control of stress  Healthy Coping - support of friends, family, and medical support team   Reducing Risks - Will discuss at f/u apts.  Weight loss goal of 7 - 10% of current body weight pounds as a reasonable goal to help increase insulin sensitivity   .      Goals:   1.  Practice healthy stress management and get good quality sleep with the goal of 7-8 hours per night.    2.   Physical activity: Recommend increasing to 2 hrs and 30 min a week (150 minutes) of moderate-intensity, or 1 hr and 15 min (75 minutes) a week of vigorous-intensity aerobic physical activity, or an equivalent combination of moderate- and vigorous-intensity aerobic physical activity.  Aerobic activity should be performed in episodes of at least 10 minutes, preferably spread throughout the week.  Muscle-strengthening activities on 2 or more days per week.    3.  Eat in a healthy way, per diabetic plate method.  Nutrition reference: Eat 3 meals a day; snacks are optional.  A meal is three or more food groups; make it colorful for better nutrition.    4.  Total Carbohydrate intake 30 grams for meals, snacks ~ 15 grams  5.  Pt to monitor BG BID.  BG goals: Fasting and before meals 80 - 120 mg/dL, 2 hrs after the start of a meal 80 - 140 mg/dL.    6.  Goal weight 170#  7.  Read handouts  provided.  F/u in 6-8 weeks   8.  Ozempic 0.25mg weekly x 4 weeks, 0.5mg x 2 weeks, then 1.0mg weekly goal dose  9.  Basaglar continue to increase by 1u q evening until am BG goal in range.        Professional Services   Time spent with pt 60 min   cc Dr. Flor

## 2022-02-15 NOTE — NURSING NOTE
Asthma Control Test (ACT) Total Entered On:  5/7/2019 12:08 PM CDT    Performed On:  5/7/2019 12:08 PM CDT by Kaylene Streeter CMA               Asthma Control Test (ACT) Total   Asthma Control Test Total (Adult) :   17    Kaylene Streeter CMA - 5/7/2019 12:08 PM CDT

## 2022-02-15 NOTE — LETTER
(Inserted Image. Unable to display)   February 20, 2019      ANCELMO FOWLER  [address] [place] [address] [address]  [place] [id number]        Dear ANCELMO,     Thank you for selecting [facility]) for your healthcare needs. Below you will find the results of your recent test(s) done at our clinic.     Labs certainly confirm presence of diabetes for some time.  Unclear whether you are a type II diabetic (generally more responsive to pill medications) vs. type I diabetic (dependent on insulin).  I encourage you to follow-up with both diabetic educator and Dr. Flor.        Result Name Current Result Reference Range   Hgb A1c ((H)) >14.0 2/19/2019  - <5.7   C-Peptide (ng/mL)  0.82 2/19/2019 0.80 - 3.85   U Microalbumin (mg/dL)  4.3 2/19/2019 See Note: -    Ur Creatinine (mg/dL)  31 2/19/2019 20 - 320   Ur Microalbumin/Creatinine Ratio ((H)) 139 2/19/2019  - <30       Please contact me or my assistant at [phone] if you have any questions or concerns.     Sincerely,        Fady Rogers MD    What do your labs mean?  Below is a glossary of commonly ordered labs:  LDL - Bad Cholesterol  HDL - Good Cholesterol  AST/ALT - Liver Function  Cr/Creatinine - Kidney Function  Microalbumin - Kidney Function  BUN - Kidney Function  PSA - Prostate   TSH - Thyroid Hormone  HgbA1c - Diabetes Test  Hgb (Hemoglobin) - Red Blood Cells

## 2022-02-15 NOTE — TELEPHONE ENCOUNTER
---------------------  From: Macarena Dunbar RN   To: Care Coordinators Pool ([facility]);     Sent: 6/26/2019 4:20:31 PM CDT  Subject: General Message     Patient was given Asmanex HFA 100mcg (2 boxes) per [facility]  Lot # [id number]  Expiration 10-22-2019documented

## 2022-02-15 NOTE — LETTER
(Inserted Image. Unable to display)         February 04, 2021        ANCELMO FOWLER  [address] [place] [address] [address]  [place] [id number]        Dear ANCELMO,    Thank you for selecting [facility] for your healthcare needs.    Our records indicate you are due for the following services:     Diabetic Exam ~ Please bring your glucose meter and/or your blood glucose diary to your appointment.  Fasting Lab Tests ~ Please do not eat or drink anything 10 hours prior to your scheduled appointment time.  (Water and any medications that you may need are allowed unless directed otherwise.)     If you had your labs done at another facility or with [facility] at [facility] access hospital, please bring in a copy of the results to your next visit, mail a copy, or drop off a copy of your results to your Healthcare Provider.     (FYI   Regarding office visits: In some instances, a video visit or telephone visit may be offered as an option.)    To schedule an appointment or if you have further questions, please contact your clinic at [phone].    Powered by [email or web address]    Sincerely,    Nik Flor MD

## 2022-02-15 NOTE — TELEPHONE ENCOUNTER
---------------------  From: Fabiana Vasquez CMA   To: Recall  Pool ([id number]_[place] [facility] [place]);     Sent: 2/2/2021 1:23:56 PM CST  Subject: General Message     Could you send a reminder letter in the mail for pt for DM check and fasting lab work. Pt has been called 4x but we have been unable to get ahold of him. I didn't see that a letter has been sent yet, could we try that?    Thank you---------------------  From: Demetra Malik (Recall  Pool ([id number]_[facility]))   To: Fabiana Vasquez CMA;     Sent: 2/2/2021 1:50:55 PM CST  Subject: RE: General Message     Please place a new return to clinic order. The reason a letter hasn't been sent is due to not having an active reminder in the patient's chart to trigger the recall staff to send one. If you have any questions let me know.    Thank you,  Demetra---------------------  From: Demetra Malik (Recall  Pool ([id number]))   To: Demetra Malik;     Sent: 2/2/2021 1:51:57 PM CST  Subject: FW: General Message

## 2022-02-15 NOTE — LETTER
(Inserted Image. Unable to display)     December 02, 2019      ANCELMO FOWLER  [address] [place] [address] [address]  [place] [id number]          Dear ANCELMO,      Thank you for selecting [facility] (previously [facility]) for your healthcare needs.    Your Healthcare Provider has recommended that you schedule a diabetes management appointment with our Certified Diabetes Educator, Jeannie Velázquez RD, CD, CDE.     Please bring your glucose meter and your blood glucose diary to your appointment.    Available services include:  - Education about diabetes with guidance and support   - Education on the 7 Self Care Behaviors for Diabetes Management:     Healthy Eating   portion control, label readings, carbohydrate recommendations, heart healthy guidelines, meal planning    Being Active - Physical activity guidelines     Monitoring   blood glucose targets, evaluation of blood glucose readings and lab results    Taking Medication   medication management    Problem Solving   discuss any concerns/ questions     Healthy Coping   disease progression and management    Reducing Risks   prevention strategies to help avoid potential complications related to uncontrolled diabetes  - Weight loss strategies    To schedule an appointment or if you have further questions, please contact your primary clinic:   [facility]       [phone]   [facility]       [phone]              [facility]     [phone]      Powered by [employer]    Sincerely,    Providers at [facility]

## 2022-02-15 NOTE — TELEPHONE ENCOUNTER
---------------------  From: Jailyn Orantes LPN (Phone Messages Pool ([id number]))   To: DESHAWN Message Pool ([address]_[place] [facility] [place]);     Sent: 5/3/2019 4:08:18 PM CDT  Subject: Atorvastatin Dose       Phone Message    PCP: DESHAWN    Time of call: 3:56pm message left    Person calling: [facility]  Contact # : [phone]    MESSAGE: Calling for verification on Atorvastatin dose. They received 2 rxs, one for 10mg and one for 40mg? Please advise.    Last visit/reason: 5/3/19 - DMcontacted Jordan and verified 40mg dose

## 2022-02-15 NOTE — NURSING NOTE
Depression Screening Entered On:  5/7/2019 12:04 PM CDT    Performed On:  5/3/2019 12:03 PM CDT by Kaylene Streeter CMA               Depression Josephine   Little Interest - Pleasure in Activities :   Nearly every day   Feeling Down, Depressed, Hopeless :   Nearly every day   Initial Depression Screen Score :   6    Trouble Falling or Staying Asleep :   Nearly every day   Feeling Tired or Little Energy :   More than half the days   Poor Appetite or Overeating :   Not at all   Feeling Bad About Yourself :   More than half the days   Trouble Concentrating :   More than half the days   Moving or Speaking Slowly :   Not at all   Thoughts Better Off Dead or Hurting Self :   More than half the days   Detailed Depression Screen Score :   11    Total Depression Screen Score :   17    Kaylene Streeter CMA - 5/7/2019 12:03 PM CDT

## 2022-02-15 NOTE — NURSING NOTE
Comprehensive Intake Entered On:  2/14/2019 8:01 AM CST    Performed On:  2/14/2019 7:57 AM CST by Fabiana Vasquez CMA               Summary   Chief Complaint :   1. Med check 2. c/o right shoulder blade pain x 3 months 3. numbness in middle, ring and pinky finger of left hand x 1 month   Advance Directive :   Yes   Weight Measured :   179.0 lb(Converted to: 179 lb 0 oz, 81.19 kg)    Height Measured :   70 in(Converted to: 5 ft 10 in, 177.80 cm)    Body Mass Index :   25.68 kg/m2 (HI)    Body Surface Area :   2 m2   Systolic Blood Pressure :   114 mmHg   Diastolic Blood Pressure :   70 mmHg   Mean Arterial Pressure :   85 mmHg   Peripheral Pulse Rate :   64 bpm   Fabiana Vasquez CMA - 2/14/2019 7:57 AM CST   Health Status   Allergies Verified? :   Yes   Medication History Verified? :   Yes   Immunizations Current :   Yes   Pre-Visit Planning Status :   Completed   Tobacco Use? :   Never smoker   Fabiana Vasquez CMA - 2/14/2019 7:57 AM CST   Consents   Consent for Immunization Exchange :   Consent Granted   Consent for Immunizations to Providers :   Consent Granted   Fabiana Vasquez CMA - 2/14/2019 7:57 AM CST   Meds / Allergies   (As Of: 2/14/2019 8:01:36 AM CST)   Allergies (Active)   No known allergies  Estimated Onset Date:   Unspecified ; Created By:   Carina Bruno; Reaction Status:   Active ; Category:   Drug ; Substance:   No known allergies ; Type:   Allergy ; Updated By:   Carina Bruno; Source:   Paper Chart ; Reviewed Date:   7/30/2015 9:46 PM CDT        Medication List   (As Of: 2/14/2019 8:01:36 AM CST)   Prescription/Discharge Order    albuterol  :   albuterol ; Status:   Prescribed ; Ordered As Mnemonic:   Ventolin HFA 90 mcg/inh inhalation aerosol ; Simple Display Line:   2 puff(s), inh, q4 hrs, 1 EA, 10 Refill(s) ; Ordering Provider:   Nik Flor MD; Catalog Code:   albuterol ; Order Dt/Tm:   6/13/2018 8:44:24 AM

## 2022-02-15 NOTE — TELEPHONE ENCOUNTER
Entered by Nguyễn Camacho CMA on June 06, 2019 2:45:28 PM CDT  ---------------------  From: Nguyễn Camacho CMA   To: Po [facility]omin    Sent: 6/6/2019 2:45:28 PM CDT  Subject: Medication Management     ** Approved with modifications: **  F[employer] Med (CONTOUR NEXT EZ MC STRIPS MG APPLY)  USE TO TEST BLOOD SUGARS ONCE DAILY  Qty:  50 unknown unit        Days Supply:  0        Refills:  11          Substitutions Allowed     Route To Pharmacy - [facility]   Signed by Nguyễn Cmaacho CMA            ** Patient matched by Nguyễn Camacho CMA on 6/6/2019 2:44:17 PM CDT **      ------------------------------------------  From: Po Momin  To: Nik Flor MD  Sent: June 6, 2019 2:28:02 PM CDT  Subject: Medication Management  Due: June 7, 2019 2:28:02 PM CDT    ** On Hold Pending Signature **  Drug: CONTOUR NEXT EZ MC STRIPS MG APPLY  USE TO TEST BLOOD SUGARS ONCE DAILY  Quantity: 50 unknown unit  Days Supply: 0         Refills: 0  Substitutions Allowed  Notes from Pharmacy:     Dispensed Drug: CONTOUR NEXT EZ MC STRIPS MG APPLY  USE TO TEST BLOOD SUGARS ONCE DAILY  Quantity: 50 unknown unit  Days Supply: 0         Refills: 0  Substitutions Allowed  Notes from Pharmacy:   ------------------------------------------

## 2022-02-15 NOTE — NURSING NOTE
Comprehensive Intake Entered On:  2/12/2020 9:47 AM CST    Performed On:  2/12/2020 9:43 AM CST by Fabiana Vasquez CMA               Summary   Chief Complaint :   Med check   Advance Directive :   Yes   Weight Measured :   199.4 lb(Converted to: 199 lb 6 oz, 90.45 kg)    Height Measured :   70.5 in(Converted to: 5 ft 10 in, 179.07 cm)    Body Mass Index :   28.2 kg/m2 (HI)    Body Surface Area :   2.12 m2   Systolic Blood Pressure :   122 mmHg   Diastolic Blood Pressure :   74 mmHg   Mean Arterial Pressure :   90 mmHg   Peripheral Pulse Rate :   74 bpm   Fabiana Vasquez CMA - 2/12/2020 9:43 AM CST   Health Status   Allergies Verified? :   Yes   Medication History Verified? :   Yes   Immunizations Current :   Yes   Pre-Visit Planning Status :   Completed   Tobacco Use? :   Current every day smoker   Fabiana Vasquez CMA - 2/12/2020 9:43 AM CST   Consents   Consent for Immunization Exchange :   Consent Granted   Consent for Immunizations to Providers :   Consent Granted   Fabiana Vasquez CMA - 2/12/2020 9:43 AM CST   Meds / Allergies   (As Of: 2/12/2020 9:47:14 AM CST)   Allergies (Active)   No known allergies  Estimated Onset Date:   Unspecified ; Created By:   Carina Bruno; Reaction Status:   Active ; Category:   Drug ; Substance:   No known allergies ; Type:   Allergy ; Updated By:   Carina Bruno; Source:   Paper Chart ; Reviewed Date:   2/26/2019 4:45 PM CST        Medication List   (As Of: 2/12/2020 9:47:14 AM CST)   Prescription/Discharge Order    albuterol  :   albuterol ; Status:   Prescribed ; Ordered As Mnemonic:   Ventolin HFA 90 mcg/inh inhalation aerosol ; Simple Display Line:   2 puff(s), inh, q4 hrs, 1 EA, 11 Refill(s) ; Ordering Provider:   Nik Flor MD; Catalog Code:   albuterol ; Order Dt/Tm:   2/14/2019 8:20:21 AM CST          atorvastatin  :   atorvastatin ; Status:   Prescribed ; Ordered As Mnemonic:   atorvastatin 40 mg oral tablet ; Simple Display Line:   40  mg, 1 tab(s), PO, Daily, 90 tab(s), 1 Refill(s) ; Ordering Provider:   Fady Rogers MD; Catalog Code:   atorvastatin ; Order Dt/Tm:   5/3/2019 3:40:49 PM CDT          enalapril  :   enalapril ; Status:   Prescribed ; Ordered As Mnemonic:   enalapril 20 mg oral tablet ; Simple Display Line:   20 mg, 1 tab(s), Oral, bid, 180 tab(s), 1 Refill(s) ; Ordering Provider:   Fady Rogers MD; Catalog Code:   enalapril ; Order Dt/Tm:   5/3/2019 3:27:55 PM CDT          metFORMIN  :   metFORMIN ; Status:   Prescribed ; Ordered As Mnemonic:   metFORMIN 500 mg oral tablet ; Simple Display Line:   1,000 mg, 2 tab(s), PO, BID, 360 tab(s), 3 Refill(s) ; Ordering Provider:   Fady Rogers MD; Catalog Code:   metFORMIN ; Order Dt/Tm:   7/30/2019 12:07:18 PM CDT          Miscellaneous Prescription  :   Miscellaneous Prescription ; Status:   Prescribed ; Ordered As Mnemonic:   Contour microlet lancets ; Simple Display Line:   See Instructions, testing daily, 1 box(es), 1 Refill(s) ; Ordering Provider:   Nik Flor MD; Catalog Code:   Miscellaneous Prescription ; Order Dt/Tm:   2/19/2019 4:35:29 PM CST          Misc Prescription  :   Misc Prescription ; Status:   Prescribed ; Ordered As Mnemonic:   CONTOUR NEXT EZ MC STRIPS MG APPLY ; Simple Display Line:   See Instructions, USE TO TEST BLOOD SUGARS ONCE DAILY, 50 unknown unit, 11 Refill(s) ; Ordering Provider:   Nik lFor MD; Catalog Code:   Miscellaneous Prescription ; Order Dt/Tm:   6/6/2019 2:45:28 PM CDT          Miscellaneous Prescription  :   Miscellaneous Prescription ; Status:   Prescribed ; Ordered As Mnemonic:   Contour Next test strips ; Simple Display Line:   See Instructions, testing daily, 100 EA, 1 Refill(s) ; Ordering Provider:   Nik Flor MD; Catalog Code:   Miscellaneous Prescription ; Order Dt/Tm:   2/19/2019 4:34:38 PM CST          mometasone  :   mometasone ; Status:   Prescribed ; Ordered As Mnemonic:   Asmanex  mcg/inh inhalation aerosol ;  Simple Display Line:   100 mcg, Inhale, bid, 2 EA, 0 Refill(s) ; Ordering Provider:   Fady Rogers MD; Catalog Code:   mometasone ; Order Dt/Tm:   6/27/2019 10:31:49 AM CDT          semaglutide  :   semaglutide ; Status:   Prescribed ; Ordered As Mnemonic:   Ozempic (1 mg dose) 2 mg/1.5 mL subcutaneous solution ; Simple Display Line:   0.25 mg, Subcutaneous, qweek, rotate injection sites,   take same day each week, 2 EA, 4 Refill(s) ; Ordering Provider:   Fady Rogers MD; Catalog Code:   semaglutide ; Order Dt/Tm:   2/26/2019 1:02:42 PM CST            Home Meds    aspirin  :   aspirin ; Status:   Processing ; Ordered As Mnemonic:   aspirin 81 mg oral tablet ; Simple Display Line:   81 mg, 1 tab(s), Oral, daily, 0 Refill(s) ; Action Display:   Discontinue ; Catalog Code:   aspirin ; Order Dt/Tm:   2/12/2020 9:45:46 AM CST

## 2022-02-15 NOTE — LETTER
(Inserted Image. Unable to display)     July 12, 2019      ANCELMO FOWLER  [address] [place] [address] [address]  [place] [id number]          Dear ANCELMO,      Thank you for selecting [facility] ([facility]viously [facility]) for your healthcare needs.    Your Healthcare Provider has recommended that you schedule a diabetes management appointment with our Certified Diabetes Educator, Jeannie Velázquez RD, CD, CDE.     Please bring your glucose meter and your blood glucose diary to your appointment.    Available services include:  - Education about diabetes with guidance and support   - Education on the 7 Self Care Behaviors for Diabetes Management:     Healthy Eating   portion control, label readings, carbohydrate recommendations, heart healthy guidelines, meal planning    Being Active - Physical activity guidelines     Monitoring   blood glucose targets, evaluation of blood glucose readings and lab results    Taking Medication   medication management    Problem Solving   discuss any concerns/ questions     Healthy Coping   disease progression and management    Reducing Risks   prevention strategies to help avoid potential complications related to uncontrolled diabetes  - Weight loss strategies    To schedule an appointment or if you have further questions, please contact your primary clinic:   [facility]       [phone]   [facility]       [phone]              [facility]     [phone]      Powered by [employer] and [employer]    Sincerely,    Providers at [facility]   no cough, + shortness of breath.

## 2022-02-15 NOTE — PROGRESS NOTES
Chief Complaint    HTN f/u, refill meds.  History of Present Illness       Patient is here for refill of his medications       Patient's been diagnosed with diabetes he has hypertension and is had asthma.  He denies any significant mood disorders but he stopped drinking alcohol.  With that he says he is no longer hearing voices and he admits he is more sociable get out and see people.  He has embraced exercise and there because his breathing to be much better and overall him feel better.  Last fall he said he went on multiple 100 mile bike rides.  Review of Systems       No headache, no wheezing, no numbness or tingling, no sadness,  Physical Exam   Vitals & Measurements    T: 97.7  F (Temporal Artery)  HR: 58 (Peripheral)  BP: 136/81  SpO2: 98%     HT: 70.5 in  WT: 204.3 lb  BMI: 28.9        General: Alert and oriented, No acute distress.       Eye: Pupils are equal, round and reactive to light, Normal conjunctiva.       HENT: Oral mucosa is moist.       Neck: Supple.       Respiratory: Respirations are non-labored.       Cardiovascular: Normal rate, Regular rhythm, No edema.       Gastrointestinal: Non-distended.       Musculoskeletal: Normal gait.       Integumentary: Warm, No rash.       Psychiatric: Cooperative, Appropriate mood & affect, Normal judgment  Assessment/Plan       1. Alcoholic (F10.20)          He describes being sober now does admit that he thinks it hurt a lot today with some of his thoughts and is happier being sober       2. Diabetes mellitus (E11.9)          He has lost weight as well he checks his sugars have been excellent A1c from today 6.2 and LDL of 83 he is on aspirin       2. Diabetes mellitus (E11.9)       3. HTN (hypertension) (I10)          Blood pressure today is 136/81 we will continue on enalapril       3. HTN (hypertension) (I10)       4. Hyperlipidemia (E78.5)          Good control on atorvastatin       4. Hyperlipidemia (E78.5)       5. Depression, recurrent (F33.9)           Suspect his depression with psychosis was related to alcohol excess  Patient Information     Name:ANCELMO FOWLER      Address:      [address] [place] [address]      [place] [id number]     Sex:Male     YOB: 1965     [phone]     Emergency Contact:OPAL BURNS     MRN:[id number]     FIN:[id number]     Location:[facility]     Date of Service:04/01/2021      Primary Care Physician:       Nik Flor MD, [phone]      Attending Physician:       Nik Flor MD, [phone]  Problem List/Past Medical History    Ongoing     Alcoholic     Allergic asthma     Attention deficit disorder of adult     Depression, recurrent     Diabetes mellitus     Diabetes mellitus     GERD (gastroesophageal reflux disease)     HTN (hypertension)     Hyperlipidemia     Obesity     Seasonal allergies    Historical     *Hospitalized@[facility] - Alcohol dependence     Schizophrenia     Tobacco abuse  Procedure/Surgical History     Appendectomy (11/23/2005)  Medications    Asmanex  mcg/inh inhalation aerosol, 100 mcg, Inhale, bid, 3 refills    atorvastatin 40 mg oral tablet, 40 mg= 1 tab(s), Oral, daily, 3 refills    Contour microlet lancets, See Instructions, 1 refills    CONTOUR NEXT EZ MC STRIPS MG APPLY, See Instructions    CONTOUR NEXT EZ MC STRIPS MG APPLY, See Instructions, 11 refills    CONTOUR NEXT EZ MC STRIPS MG APPLY, See Instructions    Contour Next test strips, See Instructions, 1 refills    enalapril 20 mg oral tablet, 20 mg= 1 tab(s), Oral, bid, 1 refills    metFORMIN 500 mg oral tablet, 1000 mg= 2 tab(s), Oral, bid, 3 refills    Ozempic (1 mg dose) 2 mg/1.5 mL subcutaneous solution, 0.25 mg, Subcutaneous, qweek, 4 refills    Ventolin HFA 90 mcg/inh inhalation aerosol, 2 puff(s), Inhale, q4 hrs, 11 refills  Allergies    No known allergies  Social History    Smoking Status     Former smoker     Alcohol      Past     Electronic Cigarette/Vaping      Electronic Cigarette Use: Never.      Tobacco      Former smoker, quit more than 30 days ago  Family History    Alcohol abuse: Grandfather (M).    High blood pressure: Mother.    Suicide: Grandfather (M).  Immunizations      Vaccine Date Status          influenza virus vaccine, inactivated 11/06/2017 Recorded              Comments : [11/6/2017] [employer] Lot [id number] exp 5/30/18          tetanus/diphth/pertuss (Tdap) adult/adol 11/01/2009 Recorded  Lab Results          Lab Results (Last 4 results within 90 days)           Sodium Level: 139 mmol/L [135 mmol/L - 146 mmol/L] (04/01/21 10:23:00)          Potassium Level: 5 mmol/L [3.5 mmol/L - 5.3 mmol/L] (04/01/21 10:23:00)          Chloride Level: 108 mmol/L [98 mmol/L - 110 mmol/L] (04/01/21 10:23:00)          CO2 Level: 25 mmol/L [20 mmol/L - 32 mmol/L] (04/01/21 10:23:00)          Glucose Level: 106 mg/dL High [65 mg/dL - 99 mg/dL] (04/01/21 10:23:00)          BUN: 21 mg/dL [7 mg/dL - 25 mg/dL] (04/01/21 10:23:00)          Creatinine Level: 0.95 mg/dL [0.7 mg/dL - 1.33 mg/dL] (04/01/21 10:23:00)          BUN/Creat Ratio: NOT APPLICABLE [6  - 22] (04/01/21 10:23:00)          eGFR: 89 mL/min/1.73m2 (04/01/21 10:23:00)          eGFR African American: 103 mL/min/1.73m2 (04/01/21 10:23:00)          Calcium Level: 9.5 mg/dL [8.6 mg/dL - 10.3 mg/dL] (04/01/21 10:23:00)          Hgb A1c: 6.2 High (04/01/21 10:23:00)          LDL Direct: 83 mg/dL (04/01/21 10:23:00)          WBC: 6 [3.8  - 10.8] (04/01/21 10:23:00)          RBC: 5.17 [4.2  - 5.8] (04/01/21 10:23:00)          Hgb: 15.9 gm/dL [13.2 gm/dL - 17.1 gm/dL] (04/01/21 10:23:00)          Hct: 48.7 % [38.5 % - 50 %] (04/01/21 10:23:00)          MCV: 94.2 fL [80 fL - 100 fL] (04/01/21 10:23:00)          MCH: 30.8 pg [27 pg - 33 pg] (04/01/21 10:23:00)          MCHC: 32.6 gm/dL [32 gm/dL - 36 gm/dL] (04/01/21 10:23:00)          RDW: 13.9 % [11 % - 15 %] (04/01/21 10:23:00)          Platelet: 171 [140  - 400] (04/01/21 10:23:00)          MPV: 10.7 fL [7.5 fL -  12.5 fL] (04/01/21 10:23:00)

## 2022-02-15 NOTE — NURSING NOTE
Depression Screening Entered On:  7/2/2019 2:00 PM CDT    Performed On:  6/26/2019 1:59 PM CDT by Palmira Arechiga               Depression Screening   Little Interest - Pleasure in Activities :   Nearly every day   Feeling Down, Depressed, Hopeless :   More than half the days   Initial Depression Screen Score :   5    Trouble Falling or Staying Asleep :   Nearly every day   Feeling Tired or Little Energy :   Nearly every day   Poor Appetite or Overeating :   Several days   Feeling Bad About Yourself :   More than half the days   Trouble Concentrating :   Nearly every day   Moving or Speaking Slowly :   Nearly every day   Thoughts Better Off Dead or Hurting Self :   More than half the days   Detailed Depression Screen Score :   17    Total Depression Screen Score :   22    Palmira Arechiga - 7/2/2019 1:59 PM CDT

## 2022-02-15 NOTE — TELEPHONE ENCOUNTER
Entered by Carmen Melgar on November 11, 2021 3:08:00 PM CST  ---------------------  From: Carmen Melgar   To: [facility]    Sent: 11/11/2021 3:08:00 PM CST  Subject: Medication Management     ** Submitted: **  Order:mometasone (Asmanex  mcg/inh inhalation aerosol)  See Instructions  INHALE 100 MCG ONE PUFF BY MOUTH TWICE DAILY  Qty:  13 gm        Days Supply:  60        Refills:  0          Substitutions Allowed     Route To Pharmacy - [facility]    Signed by Carmen Melgar  11/11/2021 9:07:00 PM [facility]    ** Submitted: **  Complete:mometasone (Asmanex  mcg/inh inhalation aerosol)   Signed by Carmen Melgar  11/11/2021 9:07:00 PM [facility]    ** Not Approved:  **  mometasone (ASMANEX  MCG AEROSOL)  INHALE 100 MCG ONE PUFF BY MOUTH TWICE DAILY  Qty:  13 gm        Days Supply:  60        Refills:  3          Substitutions Allowed     Route To Pharmacy - [facility]   Signed by Carmen Melgar                ------------------------------------------  From: [facility]  To: Nik Flor MD  Sent: November 11, 2021 10:46:21 AM CST  Subject: Medication Management  Due: October 21, 2021 8:18:07 PM CDT     ** On Hold Pending Signature **     Drug: mometasone (Asmanex  mcg/inh inhalation aerosol), INHALE 100 MCG ONE PUFF BY MOUTH TWICE DAILY  Quantity: 13 gm  Days Supply: 60  Refills: 3  Substitutions Allowed  Notes from Pharmacy:     Dispensed Drug: mometasone (Asmanex  mcg/inh inhalation aerosol), INHALE 100 MCG ONE PUFF BY MOUTH TWICE DAILY  Quantity: 13 gm  Days Supply: 60  Refills: 3  Substitutions Allowed  Notes from Pharmacy:  ---------------------------------------------------------------  From: Carmen Melgar ([facility] ([id number]))   To: [facility] Message Pool ([id number] [facility] [place]);     Sent: 11/11/2021 3:08:50 PM CST  Subject: FW: Medication Management   Due Date/Time: 11/12/2021 10:46:00 AM CST     Last visit 4/1/21 for DM. No RTC in chart.  Will give 1 refill and forward to [facility] to advise on when he should RTC- now or 4/2022? Message can wait until he gets back 11/22/21.Letter sent for patient to make an appointment prior to next refill for diabetes visit and labs.

## 2022-02-15 NOTE — PROGRESS NOTES
Patient:   ANCELMO FOWLER JR            MRN: [id number]            FIN: [id number]               Age:   54 years     Sex:  Male     :  1965   Associated Diagnoses:   Type II diabetes mellitus, uncontrolled; Attention deficit disorder of adult; HTN (hypertension); Hyperlipidemia   Author:   Fady Rogers MD      Visit Information      Date of Service: 2019 02:58 pm  Performing Location: [facility]  Encounter#: [id number]      Primary Care Provider (PCP):  Nik Flor MD    NPI# [id number]      Referring Provider:  Fady Rogers MD    NPI# [id number]      Chief Complaint   5/3/2019 3:04 PM CDT     f/u dm - BS's have been running high 90's-100's.  discuss going back on some other meds including for BP,, chol and ADDmed              Additional Information:No additional information recorded during visit.   Chief complaint and symptoms as noted above and confirmed with patient.  Recent lab and diagnostic studies reviewed with patient      History of Present Illness   2019: Presents to clinic at my request for follow-up related to symptomatic hyperglycemia.  He was seen by JUANITO this past week where he shared symptoms of chronic dry mouth probably polyuria and polydipsia.  Lab work at that time demonstrated a blood sugar of nearly 800.  No can.  Her blood sugars since 2015 with mildly elevated blood sugars in the 100s at that time.  He has no known history of diabetes prior to this.  Does not currently have any means of testing supplies or glucometer.  No significant family history of diabetes.  He was prescribed prednisone 20 mg daily this past week related to musculoskeletal complaints.     5/3/2019: Escobar presents to clinic for follow-up.  Originally had seen me approximately 3 months ago related to new onset hyperglycemia with active symptoms.  Since that time has been started on metformin as well as Ozempic which has well-controlled blood sugars with normalization of previous hyperglycemia  symptoms.  He currently does not maintain a prescription drug plan and has concerns about further cost of therapy.  Also wanting to inquire into resuming his neuro stimulant use.  Previously had been on therapy approximately 4 years ago related to underlying sleep disorder.  Also questions resuming chronic medications which have not been prescribed since 2015.         Review of Systems   Constitutional:  No fever, No chills.    Eye   Ear/Nose/Mouth/Throat:  Negative except as documented in history of present illness.    Respiratory:  No shortness of breath.    Cardiovascular:  No chest pain, No palpitations, No peripheral edema, No syncope.    Gastrointestinal:  No nausea, No vomiting, No abdominal pain.    Genitourinary:  No dysuria, No hematuria.    Hematology/Lymphatics:  Negative except as documented in history of present illness.    Endocrine:  Excessive thirst, Polyuria, No excessive hunger.    Immunologic:  No recurrent fevers.    Musculoskeletal:  No joint pain, No muscle pain.    Neurologic:  Alert and oriented X4, No numbness, No tingling, No headache.       Health Status   Allergies:    Allergic Reactions (Selected)  No known allergies   Medications:  (Selected)   Prescriptions  Prescribed  Contour Next test strips: Contour Next test strips, See Instructions, Instructions: testing daily, Supply, # 100 EA, 1 Refill(s), Type: Maintenance, Pharmacy: [facility], testing daily  Contour microlet lancets: Contour microlet lancets, See Instructions, Instructions: testing daily, Supply, # 1 box(es), 1 Refill(s), Type: Maintenance, Pharmacy: [facility], testing daily  Flovent  mcg/inh inhalation aerosol: See Instructions, Instructions: 2 puff(s) inh bid, # 1 EA, 3 Refill(s), Type: Maintenance, Pharmacy: [facility], 2 puff(s) inh bid  Ozempic (1 mg dose) 2 mg/1.5 mL subcutaneous solution: ( 1 mg ), Subcutaneous, qweek, Instructions: rotate injection sites,   take same day each week, # 2 EA, 4 Refill(s),  Type: Maintenance, Pharmacy: [facility], 1 mg Subcutaneous qweek,Instr:rotate injection sites, ; take same day each week  Ventolin HFA 90 mcg/inh inhalation aerosol: 2 puff(s), inh, q4 hrs, # 1 EA, 11 Refill(s), Type: Soft Stop, Pharmacy: [facility], 2 puff(s) Inhale q4 hrs  atorvastatin 40 mg oral tablet: = 1 tab(s) ( 40 mg ), PO, Daily, # 90 tab(s), 1 Refill(s), Type: Maintenance, Pharmacy: [facility], 1 tab(s) Oral daily  enalapril 20 mg oral tablet: = 1 tab(s) ( 20 mg ), Oral, bid, # 180 tab(s), 1 Refill(s), Type: Maintenance, Pharmacy: [facility], 1 tab(s) Oral bid  metFORMIN 500 mg oral tablet: = 2 tab(s) ( 1,000 mg ), PO, BID, # 120 tab(s), 3 Refill(s), Type: Maintenance, Pharmacy: [facility], 2 tab(s) Oral bid  Documented Medications  Documented  NexIUM: Oral, daily, PRN: for dyspepsia, 0 Refill(s), Type: Maintenance,    Medications          *denotes recorded medication          Contour Next test strips: See Instructions, testing daily, 100 EA, 1 Refill(s).          Contour microlet lancets: See Instructions, testing daily, 1 box(es), 1 Refill(s).          Ventolin HFA 90 mcg/inh inhalation aerosol: 2 puff(s), inh, q4 hrs, 1 EA, 11 Refill(s).          atorvastatin 40 mg oral tablet: 40 mg, 1 tab(s), PO, Daily, 90 tab(s), 1 Refill(s).          enalapril 20 mg oral tablet: 20 mg, 1 tab(s), Oral, bid, 180 tab(s), 1 Refill(s).          *NexIUM: Oral, daily, PRN: for dyspepsia, 0 Refill(s).          Flovent  mcg/inh inhalation aerosol: See Instructions, 2 puff(s) inh bid, 1 EA, 3 Refill(s).          metFORMIN 500 mg oral tablet: 1,000 mg, 2 tab(s), PO, BID, 120 tab(s), 3 Refill(s).          Ozempic (1 mg dose) 2 mg/1.5 mL subcutaneous solution: 1 mg, Subcutaneous, qweek, rotate injection sites,   take same day each week, 2 EA, 4 Refill(s).     Problem list:    All Problems  Attention deficit disorder of adult / SNOMED CT 8359652164 / Confirmed  Alcoholic / SNOMED CT 47338004 / Confirmed  Allergic  asthma / SNOMED CT 6018686983 / Confirmed  GERD (gastroesophageal reflux disease) / SNOMED CT 692236260 / Confirmed  Hyperlipidemia / SNOMED CT 18466277 / Confirmed  HTN (hypertension) / SNOMED CT 1468666018 / Confirmed  Obesity / SNOMED CT 3060276466 / Probable  Schizophrenia / SNOMED CT 19864944 / Confirmed  Seasonal allergies / SNOMED CT 997330586 / Confirmed  Tobacco abuse / ICD-9-.1 / Confirmed  Inactive: Depression, recurrent / SNOMED CT 362590766  Resolved: *Hospitalized@[place] - Alcohol dependence      Histories   Past Medical History:    Active  Attention deficit disorder of adult (8517608531)  Seasonal allergies (086935638)  Allergic asthma (1962613506)  HTN (hypertension) (7595252916)  Tobacco abuse (305.1)  Obesity (8872777248)  Schizophrenia (31772342)  GERD (gastroesophageal reflux disease) (682889562)  Hyperlipidemia (67677572)  Resolved  *Hospitalized@[place] - Alcohol dependence: Onset on 2/21/2015 at 49 years.  Resolved on 2/25/2015 at 49 years.   Family History:    Suicide  Grandfather (M)  High blood pressure  Mother  Alcohol abuse  Grandfather (M)     Procedure history:    Appendectomy (811221557) on 11/23/2005 at 40 Years.   Social History:        Tobacco Assessment            Current, Oral      Physical Examination   vital signs stable, as noted above   Vital Signs   5/3/2019 3:04 PM CDT Temperature Tympanic 97.3 DegF  LOW    Peripheral Pulse Rate 80 bpm    Systolic Blood Pressure 100 mmHg    Diastolic Blood Pressure 76 mmHg    Mean Arterial Pressure 84 mmHg      Measurements from flowsheet : Measurements   5/3/2019 3:04 PM CDT     Weight Measured - Standard                179.12 lb     General:  Alert and oriented, No acute distress.    Eye:  Extraocular movements are intact.    HENT:  Normocephalic, No pharyngeal erythema, dry mouth.    Neck:  Supple, No carotid bruit, No jugular venous distention, No lymphadenopathy, No thyromegaly.    Respiratory:  Lungs are clear to auscultation,  Respirations are non-labored.    Cardiovascular:  Normal rate, Regular rhythm, No murmur, No edema.    Gastrointestinal:  Soft, Non-tender, Non-distended, Normal bowel sounds.    Musculoskeletal:  Normal range of motion.    Neurologic:  Alert, Oriented.    Cognition and Speech:  Oriented, Speech clear and coherent.    Psychiatric:  Appropriate mood & affect.       Review / Management   Results review:  Lab results   2/19/2019 10:04 AM CST Hgb A1c >14.0 (Modified)    C-Peptide 0.82 ng/mL (Modified)    U Microalbumin 4.3 mg/dL (Modified)    Ur Creatinine 31 mg/dL (Modified)    Ur Microalb/Creat Ratio 139  HI (Modified)    Glutamic Acid Decarboxylase (DENISE) <5 IU/mL     Insulin Ab <0.4 unit/mL     IA-2 Antibody <5.4 unit/mL     Test in Question - Test(s) Ordered  GAD65, IA-2, AND INSULIN AUTO     Misc Test CPT Code  65403JFCO     Misc Test Client Contact KALYN PAUL     Misc Test Comment See comment     Misc Test Comment See comment    2/19/2019 9:03 AM CST Blood Glucose, Capillary 269 mg/dL    2/14/2019 8:19 AM CST Sodium Level 126 mmol/L  LOW (Modified)    Potassium Level 4.1 mmol/L (Modified)    Chloride Level 86 mmol/L  LOW (Modified)    CO2 Level 25 mmol/L (Modified)    Glucose Level 757 mg/dL  HI (Modified)    BUN 20 mg/dL (Modified)    Creatinine 0.99 mg/dL (Modified)    BUN/Creat Ratio NOT APPLICABLE (Modified)    eGFR 87 mL/min/1.73m2 (Modified)    eGFR African American 100 mL/min/1.73m2 (Modified)    Calcium Level 9.7 mg/dL (Modified)    LDL Direct 207 mg/dL  HI (Modified)    TSH 2.89 mIU/L (Modified)    WBC 5.2 (Modified)    RBC 4.66 (Modified)    Hgb 15.3 gm/dL (Modified)    Hct 46.1 % (Modified)    MCV 98.9 fL (Modified)    MCH 32.8 pg (Modified)    MCHC 33.2 gm/dL (Modified)    RDW 12.8 % (Modified)    Platelet 176 (Modified)    MPV 11.6 fL (Modified)   .       Impression and Plan   Diagnosis     Type II diabetes mellitus, uncontrolled (FFH61-XD E11.65).     Attention deficit disorder of adult (AUB21-VE  F90.0).     HTN (hypertension) (NLM51-UJ I10).     Hyperlipidemia (WAN62-SN E78.5).        .) type II DM, controlled - recently diagnosed in 2/2019 with associated hyperglycemia symptoms  hypoglycemic medications: metformin 1g BID, Ozempic 1g qweek   - I question affordability of GLP1 agonist with his current lack of prescription drug plan - will have Sangeeta work with him for either continued samples vs. alternative therapies  insulin therapy: none  last HbA1c: >14% at diagnosis (normalization of BS since)  microvascular complications: + SERG x1  macrovascular complications: none  ASA/ELEANOR/statin:  restart enalapril 20mg BID, atorvastatin 40mg qhs.  Needs to start on ASA 81mg daily    .) ADHD, sleep concerns  - previously had been maintained on neurostimulants several years ago; sounds like component of both ADHD and primary sleep disorder  - recommending he meet with either JDL/ZIM to further evaluate for sleep disorder and appropriateness of neurostimulant use      Professional Services   Counseling Summary:  This was a 25 minute visit with greater than 50% of that time spent counseling the patient.

## 2022-02-15 NOTE — TELEPHONE ENCOUNTER
Entered by Roopa Owen MA on August 27, 2020 8:41:59 AM CDT  ---------------------  From: Roopa Owen MA   To: [facility]    Sent: 8/27/2020 8:41:59 AM CDT  Subject: Medication Management     ** Submitted: **  Order:Miscellaneous Prescription (CONTOUR NEXT EZ MC STRIPS MG APPLY)  See Instructions  USE TO TEST BLOOD SUGARS ONCE DAILY  Qty:  50 EA        Refills:  0          Substitutions Allowed     Route To Pharmacy - [facility]    Signed by Roopa Owen MA  8/27/2020 1:41:00 PM [facility]    ** Not Approved:  **  Miscellaneous Prescription (CONTOUR NEXT EZ MC STRIPS MG APPLY)  USE TO TEST BLOOD SUGARS ONCE DAILY  Qty:  50 unknown unit        Days Supply:  0        Refills:  0          Substitutions Allowed     Route To Pharmacy - [facility]   Signed by Roopa Owen MA              ** Patient matched by Roopa Owen MA on 8/27/2020 8:25:56 AM CDT **      ------------------------------------------  From: [facility]  To: Nik Flor MD  Sent: August 26, 2020 3:51:54 PM CDT  Subject: Medication Management  Due: August 12, 2020 4:14:54 PM CDT     ** On Hold Pending Signature **     Dispensed Drug: CONTOUR NEXT EZ MC STRIPS MG APPLY, USE TO TEST BLOOD SUGARS ONCE DAILY  Quantity: 50 unknown unit  Days Supply: 0  Refills: 0  Substitutions Allowed  Notes from Pharmacy:  ------------------------------------------Med Refill      Date of last office visit and reason:  2/12/2020 sarahi SOLOMON for 6month f/u      Date of last Med Check / Px:   _  Date of last labs pertaining to med:  _    Note:  _    RTC order in chart:  RTC due now    For Protocol refill, has patient been contacted:  message sent to pharmacy

## 2022-02-15 NOTE — LETTER
(Inserted Image. Unable to display)   [address] [place] [address]  April 05, 2021        ANCELMO FOWLER      [address] [place] [address] [place] [address]        Dear ANCELMO,    Thank you for choosing [facility] for your healthcare needs. Below you will find the results of your recent test(s) done at our clinic.      Your blood sugars and cholesterol are terrific.   The labs overall are good.      Result Name Current Result Previous Result Reference Range   Sodium Level (mmol/L)  139 4/1/2021  140 2/12/2020 135 - 146   Potassium Level (mmol/L)  5.0 4/1/2021  4.5 2/12/2020 3.5 - 5.3   Chloride Level (mmol/L)  108 4/1/2021  104 2/12/2020 98 - 110   CO2 Level (mmol/L)  25 4/1/2021  26 2/12/2020 20 - 32   Glucose Level (mg/dL) ((H)) 106 4/1/2021  95 2/12/2020 65 - 99   BUN (mg/dL)  21 4/1/2021  21 2/12/2020 7 - 25   Creatinine Level (mg/dL)  0.95 4/1/2021  0.97 2/12/2020 0.70 - 1.33   eGFR (mL/min/1.73m2)  89 4/1/2021  88 2/12/2020 > OR = 60 -    eGFR  (mL/min/1.73m2)  103 4/1/2021  102 2/12/2020 > OR = 60 -    Calcium Level (mg/dL)  9.5 4/1/2021 ((H)) 10.4 2/12/2020 8.6 - 10.3   Hgb A1c ((H)) 6.2 4/1/2021 ((H)) 6.3 2/12/2020  - <5.7   LDL Direct (mg/dL)  83 4/1/2021  43 2/12/2020  - <100   WBC  6.0 4/1/2021  8.8 2/12/2020 3.8 - 10.8   RBC  5.17 4/1/2021  5.24 2/12/2020 4.20 - 5.80   Hgb (gm/dL)  15.9 4/1/2021  16.7 2/12/2020 13.2 - 17.1   Hct (%)  48.7 4/1/2021  48.7 2/12/2020 38.5 - 50.0   MCV (fL)  94.2 4/1/2021  92.9 2/12/2020 80.0 - 100.0   MCH (pg)  30.8 4/1/2021  31.9 2/12/2020 27.0 - 33.0   MCHC (gm/dL)  32.6 4/1/2021  34.3 2/12/2020 32.0 - 36.0   RDW (%)  13.9 4/1/2021  13.7 2/12/2020 11.0 - 15.0   Platelet  171 4/1/2021  184 2/12/2020 140 - 400   MPV (fL)  10.7 4/1/2021  11.4 2/12/2020 7.5 - 12.5       Please contact me or my assistant at [phone] if you have any questions or concerns.     Sincerely,        Nik Flor MD        What do your labs mean?   Below is a glossary of commonly ordered labs:  LDL   Bad Cholesterol   HDL   Good Cholesterol  AST/ALT   Liver Function   Cr/Creatinine   Kidney Function  Microalbumin   Kidney Function  BUN   Kidney Function  PSA   Prostate    TSH   Thyroid Hormone  HgbA1c   Diabetes Test   Hgb (Hemoglobin)   Red Blood Cells

## 2022-02-15 NOTE — LETTER
(Inserted Image. Unable to display)   November 23, 2021  ANCELMO FOWLER  [address] [place] [address] [address]  [place] [address]        Dear ANCELMO,    Thank you for selecting [facility] for your healthcare needs.    Our records indicate you are due for the following services:     Diabetic Exam ~ Please bring your glucose meter and/or your blood glucose diary to your appointment.  Non-Fasting Labs    If you had your labs done at another facility or with [facility] Lab Testing at [facility], please bring in a copy of the results to your next visit, mail a copy, or drop off a copy of your results to your Healthcare Provider.     (FYI   Regarding office visits: In some instances, a video visit or telephone visit may be offered as an option.)    To schedule an appointment or if you have further questions, please contact your clinic at [phone].    Powered by [employer]    Sincerely,    Nik Flor MD

## 2022-02-15 NOTE — LETTER
(Inserted Image. Unable to display)   June 18, 2019        ANCELMO FOWLER      [address] [place] [address] [address]  [place] [id number]        Dear ANCELMO,    Thank you for choosing [facility] for your healthcare needs. Below you will find the results of your recent test(s) done at our clinic.      Your blood sugar control is now excellent      Result Name Current Result Previous Result Reference Range   Hgb A1c ((H)) 5.8 6/17/2019 ((H)) >14.0 2/19/2019  - <5.7       Please contact me or my assistant at [phone] if you have any questions or concerns.     Sincerely,        Nik Flor MD        What do your labs mean?  Below is a glossary of commonly ordered labs:  LDL   Bad Cholesterol   HDL   Good Cholesterol  AST/ALT   Liver Function   Cr/Creatinine   Kidney Function  Microalbumin   Kidney Function  BUN   Kidney Function  PSA   Prostate    TSH   Thyroid Hormone  HgbA1c   Diabetes Test   Hgb (Hemoglobin)   Red Blood Cells

## 2022-02-15 NOTE — TELEPHONE ENCOUNTER
Entered by Carmen Melgar on August 28, 2020 3:06:00 PM CDT  ---------------------  From: Carmen Melgar   To: [facility]    Sent: 8/28/2020 3:06:00 PM CDT  Subject: Medication Management     ** Submitted: **  Order:Miscellaneous Prescription (CONTOUR NEXT EZ MC STRIPS MG APPLY)  See Instructions  USE TO TEST BLOOD SUGARS ONCE DAILY  Qty:  50 unknown unit        Days Supply:  0        Refills:  0          Substitutions Allowed     Route To Pharmacy - [facility]    Signed by Carmen Melgar  8/28/2020 8:05:00 PM [facility]    ** Not Approved:  **  Miscellaneous Prescription (CONTOUR NEXT EZ MC STRIPS MG APPLY)  USE TO TEST BLOOD SUGARS ONCE DAILY  Qty:  50 unknown unit        Days Supply:  0        Refills:  0          Substitutions Allowed     Route To Pharmacy - [facility]   Note from Pharmacy:  PLEASE SEND NEW RX WITH ICD-10 DX CODE AS MEDICARE WILL NOT PAY WITHOUT THIS. THANKS!  Signed by Carmen Melgar            ** Patient matched by Carmen Melgar on 8/28/2020 3:04:48 PM CDT **      ------------------------------------------  From: [facility]  To: Nik Flor MD  Sent: August 27, 2020 6:13:25 PM CDT  Subject: Medication Management  Due: August 28, 2020 6:04:27 PM CDT     ** On Hold Pending Signature **     Dispensed Drug: CONTOUR NEXT EZ MC STRIPS MG APPLY, USE TO TEST BLOOD SUGARS ONCE DAILY  Quantity: 50 unknown unit  Days Supply: 0  Refills: 0  Substitutions Allowed  Notes from Pharmacy: PLEASE SEND NEW RX WITH ICD-10 DX CODE AS MEDICARE WILL NOT PAY WITHOUT THIS. THANKS!  ------------------------------------------

## 2022-02-15 NOTE — PROGRESS NOTES
Patient:   ANCELMO FOWLER JR            MRN: [id number]            FIN: [id number]               Age:   53 years     Sex:  Male     :  1965   Associated Diagnoses:   Allergic Asthma   Author:   Nik Flor MD      Chief Complaint   2018 8:12 AM CDT    Albuterol refill      History of Present Illness   see chief complaint as noted above and confirmed with the patient     53 year old male presents today with request of his Albuterol inhaler. He uses the inhaler 2-3 times daily, sometimes 4 times if needed. He feels it works well for his cough, wheezing, and shortness of breath. He denies chest pains or pressures, is able to be active without aches and pains. He has Schizophrenia, alcholism, hyperlipidemia, hypertension, gerd, and depression, he chooses not to treat them. He weighed 264 last May 2017 and he now  weighs 204 pounds.       Review of Systems   Constitutional:  No fever.    Ear/Nose/Mouth/Throat:  No nasal congestion, No sore throat.    Respiratory:  No shortness of breath, No cough, No wheezing.    Cardiovascular:  No chest pain.    Gastrointestinal:  No nausea, No vomiting, No diarrhea.    Musculoskeletal:  No back pain.    Neurologic:  No headache.       Health Status   Allergies:    Allergic Reactions (Selected)  No known allergies   Medications:  (Selected)   Prescriptions  Prescribed  Ventolin HFA 90 mcg/inh inhalation aerosol: See Instructions, Instructions: INHALE 2 PUFFS EVERY FOUR HOURS AS NEEDED FOR SHORTNESS OF BREATH, # 18 unknown unit, Type: Soft Stop, Pharmacy: [facility]   Problem list:    All Problems  Tobacco abuse / ICD-9-.1 / Confirmed  Seasonal Allergies / ICD-9-.9 / Confirmed  Schizophrenia / SNOMED CT 45855617 / Confirmed  Obesity / ICD-9-.00 / Probable  Hyperlipidemia / SNOMED CT 23761326 / Confirmed  HTN (Hypertension) / ICD-9-.9 / Confirmed  GERD (gastroesophageal reflux disease) / SNOMED CT 549490184 / Confirmed  Attention Deficit Disorder  of Adult / ICD-9-.00 / Confirmed  Allergic Asthma / ICD-9-.90 / Confirmed  Alcoholic / SNOMED CT 70336974 / Confirmed  Inactive: Depression, Recurrent / ICD-9-.30  Resolved: *Hospitalized@[place] - Alcohol dependence      Histories   Past Medical History:    Active  Attention Deficit Disorder of Adult (314.00)  Seasonal Allergies (477.9)  Allergic Asthma (493.90)  HTN (Hypertension) (401.9)  Tobacco abuse (305.1)  Schizophrenia (18396498)  GERD (gastroesophageal reflux disease) (788659405)  Hyperlipidemia (49480598)  Resolved  *Hospitalized@[place] - Alcohol dependence: Onset on 2/21/2015 at 49 years.  Resolved on 2/25/2015 at 49 years.   Family History:    Suicide  Grandfather (M)  High blood pressure  Mother  Alcohol abuse  Grandfather (M)     Procedure history:    Appendectomy (557402587) on 11/23/2005 at 40 Years.   Social History:        Alcohol Assessment: Denies Alcohol Use      Tobacco Assessment: Current            Current, Oral        Physical Examination   Vital Signs   6/13/2018 8:12 AM CDT Peripheral Pulse Rate 78 bpm    Systolic Blood Pressure 114 mmHg    Diastolic Blood Pressure 62 mmHg    Mean Arterial Pressure 79 mmHg      Measurements from flowsheet : Measurements   6/13/2018 8:12 AM CDT Height Measured - Standard 70 in    Weight Measured - Standard 204 lb    BSA 2.14 m2    Body Mass Index 29.27 kg/m2  HI      General:  Alert and oriented, No acute distress.    Eye:  Pupils are equal, round and reactive to light, Normal conjunctiva.    HENT:  Oral mucosa is moist.    Neck:  Supple.    Respiratory:  Lungs are clear to auscultation, Respirations are non-labored.    Cardiovascular:  Normal rate, Regular rhythm, No edema.    Gastrointestinal:  Non-distended.    Musculoskeletal:  Normal gait.    Integumentary:  Warm, No rash.    Psychiatric:  Cooperative, Appropriate mood & affect, Normal judgment.       Review / Management   Results review      Impression and Plan       Diagnosis      Allergic Asthma (REF63-QR J45.909).     Plan:  Refilled the Albuterol for the next year.     Discussed adding another inhaler since he using the Albuterol a few times each day, he would like to just use the Albuterol as he feels it works well for him. He has tried Singulair in the past and found this did not help much at all.     Discussed preventative services such as lab work, Colonoscopy, and Pneumonia vaccines he declines them at this time, say's he may do lab work next year.     Up to date on Tdap vaccine.     ACT filled out today.     Return in one year for next follow up. .    I, Carolyn Ordaz Medical Assistant acted solely as a scribe for, and in presence of Dr. Nik Flor who performed the services.

## 2022-02-15 NOTE — TELEPHONE ENCOUNTER
Entered by Rica Machado CMA on March 29, 2019 10:22:33 AM CDT  ---------------------  From: Rica Machado CMA   To: [facility]    Sent: 3/29/2019 10:22:33 AM CDT  Subject: Medication Management     ** Not Approved: Patient has requested refill too soon, #100, 1 refill sent 2/19/19 **  Freetext Med (CONTOUR NEXT EZ MC STRIPS MG APPLY)  USE TO TEST BLOOD SUGARS ONCE DAILY  Qty:  100 unknown unit        Days Supply:  0        Refills:  0          Substitutions Allowed     Route To Pharmacy - [facility]   Signed by Rica Machado CMA            ** Patient matched by Rica Machado CMA on 3/29/2019 10:21:04 AM CDT **      ------------------------------------------  From: [facility]  To: Nik Flor MD  Sent: March 29, 2019 9:54:47 AM CDT  Subject: Medication Management  Due: March 30, 2019 9:54:47 AM CDT    ** On Hold Pending Signature **  Drug: CONTOUR NEXT EZ MC STRIPS MG APPLY  USE TO TEST BLOOD SUGARS ONCE DAILY  Quantity: 100 unknown unit Days Supply: 0         Refills: 0  Substitutions Allowed  Notes from Pharmacy:     Dispensed Drug: CONTOUR NEXT EZ MC STRIPS MG APPLY  USE TO TEST BLOOD SUGARS ONCE DAILY  Quantity: 100 unknown unit Days Supply: 0         Refills: 0  Substitutions Allowed  Notes from Pharmacy:   ------------------------------------------

## 2022-02-15 NOTE — NURSING NOTE
Pt will see me next week, and I instructed her to increase PPI to 40 mgs daily besides restart Ibuprofen.  NOTE : pt DENIES Dx by Rheum, Dr. Durbin of [facility] -- BUT seems that she was NOT aware of this Dx.  Also states last saw him years ago, and wants referral to different rheumatologist.   Quick Intake Entered On:  2/26/2019 1:06 PM CST    Performed On:  2/26/2019 1:05 PM CST by Jeannie Velázquez               Summary   Advance Directive :   Yes   Weight Measured :   180.8 lb(Converted to: 180 lb 13 oz, 82.01 kg)    Height Measured :   70.5 in(Converted to: 5 ft 10 in, 179.07 cm)    Body Mass Index :   25.57 kg/m2 (HI)    Body Surface Area :   2.02 m2   Jeannie Velázquez - 2/26/2019 1:05 PM CST

## 2022-02-15 NOTE — TELEPHONE ENCOUNTER
Entered by Roopa Owen MA on December 06, 2021 3:56:54 PM CST  ---------------------  From: Roopa Owen MA   To: [facility]    Sent: 12/6/2021 3:56:54 PM CST  Subject: Medication Management     ** Not Approved: Patient has requested refill too soon, refilled 4/1/2021 #90 w/ 3 refills--good thur 04/2022 **  atorvastatin (ATORVASTATIN CALCIUM 40MG TABLET)  ONE TAB(S) ORAL DAILY  Qty:  90 EA        Days Supply:  90        Refills:  3          Substitutions Allowed     Route To Pharmacy - [facility]   Signed by Roopa Owen MA            ------------------------------------------  From: [facility]  To: Nik Flor MD  Sent: December 2, 2021 11:36:19 AM CST  Subject: Medication Management  Due: November 16, 2021 3:34:48 PM CST     ** On Hold Pending Signature **     Drug: atorvastatin (atorvastatin 40 mg oral tablet), ONE TAB(S) ORAL DAILY  Quantity: 90 EA  Days Supply: 90  Refills: 3  Substitutions Allowed  Notes from Pharmacy:     Dispensed Drug: atorvastatin (atorvastatin 40 mg oral tablet), ONE TAB(S) ORAL DAILY  Quantity: 90 EA  Days Supply: 90  Refills: 3  Substitutions Allowed  Notes from Pharmacy:  ------------------------------------------

## 2022-02-15 NOTE — PROGRESS NOTES
Patient:   ANCELMO FOWLER JR            MRN: [id number]            FIN: [id number]               Age:   54 years     Sex:  Male     :  1965   Associated Diagnoses:   Diabetes mellitus type II, uncontrolled; Hyperlipidemia; HTN (hypertension)   Author:   Jeannie Velázquez      Visit Information   Visit type:  Diabetes Self Management Education .    Referral source:  Ken MEDEL, Fady.       Chief Complaint   Type II diabetes       History of Present Illness   19 Pt briefly seen by CDE for insulin, diet and BG monitoring instruction during MD appointment due to hyperglycemia.    19 visit, started Ozempic and wean off insulin   19 Pt off insulin, Ozempic dose decreased to 0.25mg/ weekly with samples - pt will stop when samples run out due to cost (no prescription drug coverage)     Discussed nutrition, diabetes, and lifestyle management with pt  Nutrition:  Pt has been following a carb controlled diet eating mostly protein and vegetables, some fruit and dairy and starch but less than prior to dx   Fluids: water, cut out regular soda, will have some diet soda  Physical Activity:  no routine at this time   Stress:   mod  Sleep: fair - tired all the time, ? sleep study - to discuss with MD   Support: family, friends  BG Testin - 156mg/dL variety of times   DM related medication:   metformin 500mg ii tabs BID - tolerating well and taking as directed   Routine diabetes care:    Eye exam completed 3/6/19  Skin: intact  Dental: due   Feet: monofilament test completed with MD   Immunizations: will need pneumonia vaccine,   19 A1C of 5.8% = 120 mg/dL estimated Average Glucose (eAG)  down from 19 Hgb A1c: >14% = >355 mg/dL eAG      Health Status   Allergies:    Allergic Reactions (Selected)  No known allergies   Medications:  (Selected)   Prescriptions  Prescribed  Asmanex  mcg/inh inhalation aerosol: ( 100 mcg ), Inhale, bid, # 2 EA, 0 Refill(s), Type: Maintenance, samples given to patient  (Rx)  CONTOUR NEXT EZ MC STRIPS MG APPLY: See Instructions, Instructions: USE TO TEST BLOOD SUGARS ONCE DAILY, # 50 unknown unit, 11 Refill(s), Type: Soft Stop, Pharmacy: [facility], USE TO TEST BLOOD SUGARS ONCE DAILY  Contour Next test strips: Contour Next test strips, See Instructions, Instructions: testing daily, Supply, # 100 EA, 1 Refill(s), Type: Maintenance, Pharmacy: [facility], testing daily  Contour microlet lancets: Contour microlet lancets, See Instructions, Instructions: testing daily, Supply, # 1 box(es), 1 Refill(s), Type: Maintenance, Pharmacy: [facility], testing daily  Ozempic (1 mg dose) 2 mg/1.5 mL subcutaneous solution: ( 0.25 mg ), Subcutaneous, qweek, Instructions: rotate injection sites,   take same day each week, # 2 EA, 4 Refill(s), Type: Maintenance, Pharmacy: [facility]  Ventolin HFA 90 mcg/inh inhalation aerosol: 2 puff(s), inh, q4 hrs, # 1 EA, 11 Refill(s), Type: Soft Stop, Pharmacy: [facility], 2 puff(s) Inhale q4 hrs  atorvastatin 40 mg oral tablet: = 1 tab(s) ( 40 mg ), PO, Daily, # 90 tab(s), 1 Refill(s), Type: Maintenance, Pharmacy: [facility], 1 tab(s) Oral daily  enalapril 20 mg oral tablet: = 1 tab(s) ( 20 mg ), Oral, bid, # 180 tab(s), 1 Refill(s), Type: Maintenance, Pharmacy: [facility], 1 tab(s) Oral bid  metFORMIN 500 mg oral tablet: = 2 tab(s) ( 1,000 mg ), PO, BID, # 360 tab(s), 3 Refill(s), Type: Maintenance, Pharmacy: [facility], 2 tab(s) Oral bid  Documented Medications  Documented  aspirin 81 mg oral tablet: = 1 tab(s) ( 81 mg ), Oral, daily, 0 Refill(s), Type: Maintenance,    Medications          *denotes recorded medication          Contour Next test strips: See Instructions, testing daily, 100 EA, 1 Refill(s).          Contour microlet lancets: See Instructions, testing daily, 1 box(es), 1 Refill(s).          CONTOUR NEXT EZ MC STRIPS MG APPLY: See Instructions, USE TO TEST BLOOD SUGARS ONCE DAILY, 50 unknown unit, 11 Refill(s).          Ventolin  HFA 90 mcg/inh inhalation aerosol: 2 puff(s), inh, q4 hrs, 1 EA, 11 Refill(s).          *aspirin 81 mg oral tablet: 81 mg, 1 tab(s), Oral, daily, 0 Refill(s).          atorvastatin 40 mg oral tablet: 40 mg, 1 tab(s), PO, Daily, 90 tab(s), 1 Refill(s).          enalapril 20 mg oral tablet: 20 mg, 1 tab(s), Oral, bid, 180 tab(s), 1 Refill(s).          metFORMIN 500 mg oral tablet: 1,000 mg, 2 tab(s), PO, BID, 360 tab(s), 3 Refill(s).          Asmanex  mcg/inh inhalation aerosol: 100 mcg, Inhale, bid, 2 EA, 0 Refill(s).          Ozempic (1 mg dose) 2 mg/1.5 mL subcutaneous solution: 0.25 mg, Subcutaneous, qweek, rotate injection sites,   take same day each week, 2 EA, 4 Refill(s).     Problem list:    All Problems  Attention deficit disorder of adult / SNOMED CT 8671996786 / Confirmed  Alcoholic / SNOMED CT 87566131 / Confirmed  Allergic asthma / SNOMED CT 8665651755 / Confirmed  GERD (gastroesophageal reflux disease) / SNOMED CT 240095737 / Confirmed  Hyperlipidemia / SNOMED CT 41442824 / Confirmed  HTN (hypertension) / SNOMED CT 8928298397 / Confirmed  Obesity / SNOMED CT 3821401266 / Probable  Schizophrenia / SNOMED CT 93511922 / Confirmed  Seasonal allergies / SNOMED CT 396752290 / Confirmed  Tobacco abuse / ICD-9-.1 / Confirmed  Inactive: Depression, recurrent / SNOMED CT 105728028  Resolved: *Hospitalized@[facility] - Alcohol dependence      Histories   Past Medical History:    Active  Attention deficit disorder of adult (9931365299)  Seasonal allergies (927162653)  Allergic asthma (0237513562)  HTN (hypertension) (5235961404)  Tobacco abuse (305.1)  Obesity (0063578190)  Schizophrenia (16394065)  GERD (gastroesophageal reflux disease) (314607465)  Hyperlipidemia (82388204)  Resolved  *Hospitalized[facility] - Alcohol dependence: Onset on 2/21/2015 at 49 years.  Resolved on 2/25/2015 at 49 years.   Family History:    Suicide  Grandfather (M)  High blood pressure  Mother  Alcohol abuse  Grandfather (M)      Procedure history:    Appendectomy (SNOMED CT 313110150) on 11/23/2005 at 40 Years.   Social History:        Alcohol Assessment            Past      Tobacco Assessment            Current, Oral      Physical Examination   Measurements from flowsheet : Measurements   7/30/2019 4:38 PM CDT Height Measured - Standard 70.5 in    Weight Measured - Standard 176.4 lb    BSA 1.99 m2    Body Mass Index 24.95 kg/m2         Health Maintenance      Recommendations     Pending (in the next year)        OverDue           Asthma - Spirometry due  02/16/17  and every 1  year(s)        Due            Alcohol Misuse Screen (Male) due  07/30/19  and every 1  year(s)           Asthma - Assessment due  07/30/19  and every 1  year(s)           Colorectal Cancer Screen (Colonoscopy) (Male) due  07/30/19  Variable frequency           Colorectal Cancer Screen (Occult Blood) (Male) due  07/30/19  Variable frequency           Colorectal Cancer Screen (Sigmoidoscopy) (Male) due  07/30/19  Variable frequency           Hepatitis C Screen [id number] (Male) due  07/30/19  One-time only        Near Due            Influenza Vaccine near due  09/01/19  and every 1  year(s)        Due In Future            Tetanus Vaccine not due until  11/01/19  and every 10  year(s)           Lipid Disorders Screen (Male) not due until  02/14/20  and every 1  year(s)           Type 2 Diabetes Mellitus Screen (Male) not due until  06/17/20  and every 1  year(s)           Depression Screen (Male) not due until  06/26/20  and every 1  year(s)           High Blood Pressure Screen (Male) not due until  06/26/20  and every 1  year(s)     Satisfied (in the past 1 year)        Satisfied            Aspirin Therapy for Prevention of CVD (Male) on  05/08/19.           Body Mass Index Check (Male) on  07/30/19.           Body Mass Index Check (Male) on  04/09/19.           Body Mass Index Check (Male) on  02/26/19.           Body Mass Index Check (Male) on  02/19/19.            Body Mass Index Check (Male) on  02/14/19.           Depression Screen (Male) on  06/26/19.           Depression Screen (Male) on  06/26/19.           Depression Screen (Male) on  06/26/19.           Depression Screen (Male) on  05/03/19.           Depression Screen (Male) on  05/03/19.           Depression Screen (Male) on  05/03/19.           High Blood Pressure Screen (Male) on  06/26/19.           High Blood Pressure Screen (Male) on  05/03/19.           High Blood Pressure Screen (Male) on  04/09/19.           High Blood Pressure Screen (Male) on  04/09/19.           High Blood Pressure Screen (Male) on  02/19/19.           High Blood Pressure Screen (Male) on  02/19/19.           High Blood Pressure Screen (Male) on  02/14/19.           Lipid Disorders Screen (Male) on  02/14/19.           Obesity Screen and Counseling (Male) on  07/30/19.           Obesity Screen and Counseling (Male) on  06/26/19.           Obesity Screen and Counseling (Male) on  05/03/19.           Obesity Screen and Counseling (Male) on  04/09/19.           Obesity Screen and Counseling (Male) on  02/26/19.           Obesity Screen and Counseling (Male) on  02/19/19.           Obesity Screen and Counseling (Male) on  02/14/19.           Type 2 Diabetes Mellitus Screen (Male) on  06/17/19.           Type 2 Diabetes Mellitus Screen (Male) on  02/19/19.        Review / Management   Results review:  Lab results: 6/17/2019 2:10 PM CDT    Hgb A1c                   5.8  HI  .       Impression and Plan   Diagnosis     Diabetes mellitus type II, uncontrolled (HWN80-QT E11.65).     Hyperlipidemia (APZ79-VO E78.5).     HTN (hypertension) (XKK07-VO I10).       Counseled: Patient, BENJIE Self Care Behaviors, pt congratulated on excellent improvement in a1c!   Today the patient was provided with a review and further instruction on the progression of diabetes mellitus, prevention of heart disease, prevention of complications, and lifestyle  guidelines.  Healthy Eating - review of carbohydrate counting, reading food labels, appropriate portion sizes, well balanced meals, diabetic plate method as a general rule.  Patient is provided with additional ideas to incorporate dietary recommendations, increase meal planning and preparation.  Mindful eating, finding other coping mechanisms besides food  Instructed on Therapeutic Lifestyle Changes (TLC) nutrition plan for heart healthy eating.     Low cholesterol (<200mg), low fat, low saturated fat, zero trans fat   Low sodium (2000mg)   High fiber diet (20 - 30gm); Soluble fiber 10+ gm/ day    Eat more omega 3 fats  Vegetarian at least 1 day per week  Being Active - Education on how exercise helps with :    - lowering BG by increasing the muscles ability to take up and use glucose   - weight loss   - healthier heart (improve lipid profile)   - improve sleep, mood, energy   - decrease stress  Monitoring -  Reviewed recommended testing schedule and blood glucose target levels.    Taking Medication - Provided a sample of Ozempic and pt will stay on 0.25mg weekly and assess BG - likely able to d/c after the sample pen is out, pt would like to stay on it as able, providing good appetite suppression   Problem Solving - medical support team assistance, resources provided (written and apps, internet); good control of stress  Healthy Coping - support of friends, family, and medical support team   Reducing Risks - Detailed education on potential complications associated with uncontrolled diabetes and prevention recommendations.  Recommendations include: annual eye exam, good oral cares with brushing BID and flossing daily, routine dental appointments, good foot care tips and shoe wear - discussed monofilament test yearly.  Importance of adequate sleep and good control of BG to prevent developing complications related to uncontrolled DM including nephropathy, neuropathy, retinopathy, and cardiovascular disease.      Pt  able to verbalize understanding of above education, handouts provided for additional resources.    Goals:   1.  Practice healthy stress management and get good quality sleep with the goal of 7-8 hours per night.    2.   Physical activity: Recommend increasing to 2 hrs and 30 min a week (150 minutes) of moderate-intensity, or 1 hr and 15 min (75 minutes) a week of vigorous-intensity aerobic physical activity, or an equivalent combination of moderate- and vigorous-intensity aerobic physical activity.  Aerobic activity should be performed in episodes of at least 10 minutes, preferably spread throughout the week.  Muscle-strengthening activities on 2 or more days per week.    3.  Eat in a healthy way, per diabetic plate method.  Nutrition reference: Eat 3 meals a day; snacks are optional.  A meal is three or more food groups; make it colorful for better nutrition.    4.  Total Carbohydrate intake 30 grams for meals, snacks ~ 15 grams  5.  Pt to monitor BG BID.  BG goals: Fasting and before meals 80 - 120 mg/dL, 2 hrs after the start of a meal 80 - 140 mg/dL.    6.  Goal weight 170  7.  Read handouts provided.  F/u in 4 months  8.  Continue with Metformin 500mg ii tabs BID and sample of Ozempic 0.25mg weekly        Professional Services   Time spent with pt 60 min   cc Dr. Fady Rogers

## 2022-04-25 DIAGNOSIS — I10 HYPERTENSION: ICD-10-CM

## 2022-04-26 ENCOUNTER — OFFICE VISIT (OUTPATIENT)
Dept: FAMILY MEDICINE | Facility: CLINIC | Age: 57
End: 2022-04-26
Payer: MEDICARE

## 2022-04-26 VITALS
WEIGHT: 229 LBS | BODY MASS INDEX: 32.39 KG/M2 | SYSTOLIC BLOOD PRESSURE: 183 MMHG | HEART RATE: 74 BPM | DIASTOLIC BLOOD PRESSURE: 117 MMHG

## 2022-04-26 DIAGNOSIS — E11.9 TYPE 2 DIABETES MELLITUS WITHOUT COMPLICATION, WITHOUT LONG-TERM CURRENT USE OF INSULIN (H): Primary | ICD-10-CM

## 2022-04-26 DIAGNOSIS — J45.30 MILD PERSISTENT ASTHMA WITHOUT COMPLICATION: ICD-10-CM

## 2022-04-26 DIAGNOSIS — I10 ESSENTIAL HYPERTENSION: ICD-10-CM

## 2022-04-26 DIAGNOSIS — E78.5 DYSLIPIDEMIA, GOAL LDL BELOW 100: ICD-10-CM

## 2022-04-26 PROBLEM — K21.9 GASTROESOPHAGEAL REFLUX DISEASE: Status: ACTIVE | Noted: 2022-04-26

## 2022-04-26 PROBLEM — J30.2 SEASONAL ALLERGIC RHINITIS: Status: ACTIVE | Noted: 2022-04-26

## 2022-04-26 PROBLEM — E66.9 OBESITY: Status: ACTIVE | Noted: 2022-04-26

## 2022-04-26 PROBLEM — F10.20 ALCOHOLISM (H): Status: ACTIVE | Noted: 2022-04-26

## 2022-04-26 PROBLEM — F90.9 ATTENTION DEFICIT DISORDER OF ADULT WITH HYPERACTIVITY: Status: ACTIVE | Noted: 2022-04-26

## 2022-04-26 PROCEDURE — 99214 OFFICE O/P EST MOD 30 MIN: CPT | Performed by: FAMILY MEDICINE

## 2022-04-26 RX ORDER — DEXTROAMPHETAMINE SACCHARATE, AMPHETAMINE ASPARTATE, DEXTROAMPHETAMINE SULFATE AND AMPHETAMINE SULFATE 7.5; 7.5; 7.5; 7.5 MG/1; MG/1; MG/1; MG/1
TABLET ORAL
COMMUNITY
Start: 2022-01-09 | End: 2022-04-26

## 2022-04-26 RX ORDER — ENALAPRIL MALEATE 20 MG/1
20 TABLET ORAL DAILY
Qty: 30 TABLET | Refills: 0 | Status: SHIPPED | OUTPATIENT
Start: 2022-04-26 | End: 2022-05-18

## 2022-04-26 RX ORDER — ALBUTEROL SULFATE 90 UG/1
2 AEROSOL, METERED RESPIRATORY (INHALATION) EVERY 6 HOURS
Qty: 18 G | Refills: 4 | Status: SHIPPED | OUTPATIENT
Start: 2022-04-26 | End: 2023-04-06

## 2022-04-26 RX ORDER — ATORVASTATIN CALCIUM 10 MG/1
40 TABLET, FILM COATED ORAL DAILY
Qty: 30 TABLET | Refills: 0 | Status: SHIPPED | OUTPATIENT
Start: 2022-04-26

## 2022-04-26 NOTE — PROGRESS NOTES
Assessment & Plan       Type 2 diabetes mellitus without complication, without long-term current use of insulin (H)  Has had no hypoglycemia says he takes his Glucophage and only occasionally checks his blood sugars  - metFORMIN (GLUCOPHAGE) 500 MG tablet; Take 2 tablets (1,000 mg) by mouth 2 times daily (with meals)    Dyslipidemia, goal LDL below 100  He is on atorvastatin  - atorvastatin (LIPITOR) 10 MG tablet; Take 4 tablets (40 mg) by mouth daily    Mild persistent asthma, unspecified whether complicated  He rarely needs to use his  - albuterol (PROAIR HFA/PROVENTIL HFA/VENTOLIN HFA) 108 (90 Base) MCG/ACT inhaler; Inhale 2 puffs into the lungs every 6 hours  - fluticasone (FLOVENT DISKUS) 100 MCG/BLIST inhaler; Inhale 1 puff into the lungs every 12 hours  - mometasone (ASMANEX TWISTHALER) 110 MCG/INH inhaler; Inhale 1 puff into the lungs every evening    Essential hypertension  He says he took his blood pressure at home and it was good please run out of medicine for the last 3 to 4 weeks  - enalapril (VASOTEC) 20 MG tablet; Take 1 tablet (20 mg) by mouth daily    I note patient declined any labs.  I believe its mix between financial and needle phobia.  I have decided that renewing his medications is more beneficial to him than the risk of abnormal laboratory values    Should follow-up in 4 months    No follow-ups on file.    Nik Flor MD  [facility]    Jv Ba is a 57 year old who presents for the following health issues     Medication Refill    History of Present Illness     Asthma:  He presents for follow up of asthma.  He has no cough, no wheezing, and no shortness of breath. He is using a relief medication daily. He typically misses taking his controller medication 2 time(s) per week.Patient is aware of the following triggers: same as previous visit, animal dander, cold air, dust mites, exercise or sports, mold, pollens, smoke, strong odors and fumes and  upper respiratory infections. The patient has not had a visit to the Emergency Room, Urgent Care or Hospital due to asthma since the last clinic visit.     Diabetes:   He presents for follow up of diabetes.  He is checking home blood glucose a few times a month. He checks blood glucose after meals.  Blood glucose is never over 200 and never under 70. When his blood glucose is low, the patient is asymptomatic for confusion, blurred vision, lethargy and reports not feeling dizzy, shaky, or weak.  He has no concerns regarding his diabetes at this time.  He is not experiencing numbness or burning in feet, excessive thirst, blurry vision, weight changes or redness, sores or blisters on feet.         Hyperlipidemia:  He presents for follow up of hyperlipidemia.  He is taking medication to lower cholesterol. He is not having myalgia or other side effects to statin medications.    Hypertension: He presents for follow up of hypertension.  He does check blood pressure  regularly outside of the clinic. Outpatient blood pressures have not been over 140/90. He does not follow a low salt diet.     He eats 0-1 servings of fruits and vegetables daily.He consumes 0 sweetened beverage(s) daily.He exercises with enough effort to increase his heart rate 60 or more minutes per day.  He exercises with enough effort to increase his heart rate 4 days per week. He is missing 1 dose(s) of medications per week.  He is not taking prescribed medications regularly due to remembering to take.             Review of Systems   CONSTITUTIONAL: NEGATIVE for fever, chills, change in weight  ENT/MOUTH: NEGATIVE for ear, mouth and throat problems  RESP: NEGATIVE for significant cough or SOB  CV: NEGATIVE for chest pain, palpitations or peripheral edema      Objective    There were no vitals taken for this visit.  There is no height or weight on file to calculate BMI.  Physical Exam   GENERAL: healthy, alert and no distress  EYES: Eyes grossly normal to  inspection, PERRL and conjunctivae and sclerae normal  HENT: ear canals and TM's normal, nose and mouth without ulcers or lesions  NECK: no adenopathy, no asymmetry, masses, or scars and thyroid normal to palpation  RESP: lungs clear to auscultation - no rales, rhonchi or wheezes  CV: regular rate and rhythm, normal S1 S2, no S3 or S4, no murmur, click or rub, no peripheral edema and peripheral pulses strong  ABDOMEN: soft, nontender, no hepatosplenomegaly, no masses and bowel sounds normal  MS: no gross musculoskeletal defects noted, no edema  SKIN: no suspicious lesions or rashes  NEURO: Normal strength and tone, mentation intact and speech normal  PSYCH: mentation appears normal, affect normal/bright

## 2022-04-27 RX ORDER — ENALAPRIL MALEATE 20 MG/1
TABLET ORAL
Qty: 180 TABLET | Refills: 1 | OUTPATIENT
Start: 2022-04-27

## 2022-05-17 DIAGNOSIS — I10 ESSENTIAL HYPERTENSION: ICD-10-CM

## 2022-05-18 RX ORDER — ENALAPRIL MALEATE 20 MG/1
TABLET ORAL
Qty: 90 TABLET | Refills: 3 | Status: SHIPPED | OUTPATIENT
Start: 2022-05-18

## 2022-05-18 NOTE — TELEPHONE ENCOUNTER
Last Written Prescription Date: 4/26/22  Last Fill Quantity: 30,  # refills: 0   Last office visit: 4/26/2022 with prescribing provider: med refill; per OV note, labs deferred     4/1/21 BMP, A1c, CBC completed

## 2022-08-02 DIAGNOSIS — E78.5 DYSLIPIDEMIA, GOAL LDL BELOW 100: ICD-10-CM

## 2022-08-03 ENCOUNTER — TELEPHONE (OUTPATIENT)
Dept: FAMILY MEDICINE | Facility: CLINIC | Age: 57
End: 2022-08-03

## 2022-08-03 DIAGNOSIS — E78.5 DYSLIPIDEMIA, GOAL LDL BELOW 100: ICD-10-CM

## 2022-08-03 DIAGNOSIS — J45.30 MILD PERSISTENT ASTHMA WITHOUT COMPLICATION: ICD-10-CM

## 2022-08-03 DIAGNOSIS — E78.5 DYSLIPIDEMIA, GOAL LDL BELOW 100: Primary | ICD-10-CM

## 2022-08-03 RX ORDER — ATORVASTATIN CALCIUM 40 MG/1
40 TABLET, FILM COATED ORAL DAILY
Qty: 90 TABLET | Refills: 2 | Status: CANCELLED | OUTPATIENT
Start: 2022-08-03

## 2022-08-03 RX ORDER — FLUTICASONE PROPIONATE 110 UG/1
1 AEROSOL, METERED RESPIRATORY (INHALATION) 2 TIMES DAILY
Qty: 24 G | Refills: 1 | Status: SHIPPED | OUTPATIENT
Start: 2022-08-03

## 2022-08-03 RX ORDER — ATORVASTATIN CALCIUM 40 MG/1
40 TABLET, FILM COATED ORAL DAILY
Qty: 90 TABLET | Refills: 0 | Status: SHIPPED | OUTPATIENT
Start: 2022-08-03

## 2022-08-03 RX ORDER — MOMETASONE FUROATE 100 UG/1
AEROSOL RESPIRATORY (INHALATION)
Refills: 0 | OUTPATIENT
Start: 2022-08-03

## 2022-08-03 RX ORDER — ATORVASTATIN CALCIUM 40 MG/1
TABLET, FILM COATED ORAL
Qty: 30 TABLET | Refills: 0 | OUTPATIENT
Start: 2022-08-03

## 2022-08-03 NOTE — TELEPHONE ENCOUNTER
Pt returning call. Pt stated he was in to see ZIM in April and he prescribed incorrect medications:  Flovent is usually an inhaler, not diskus  Atorvastatin is 40mg daily and he was given 10mg tabs taking 4 daily with qty 30.   Chart reviewed; last OV 4/26/22 med refill visit [facility]. Last labs were completed 4/1/2021.   Pt is requesting refills of these medications. Advised his labs are over a year old and may need to be seen in clinic, especially to establish care.   Please advise if in agreement for refills.

## 2022-08-03 NOTE — TELEPHONE ENCOUNTER
"Refused medications -  Unable to reach patient via phone or to leave voice message for patient (mailbox full).  Last seen 4/26/2022 by [facility]  Labs out of range.  Patient recently seen by provider in [place].     Patient needs to establish care with another provider for refills.       Requested Prescriptions   Pending Prescriptions Disp Refills     atorvastatin (LIPITOR) 40 MG tablet [Pharmacy Med Name: ATORVASTATIN CALCIUM 40MG TABLET] 30 tablet 0     Sig: TAKE 1 TABLET BY MOUTH ONCE DAILY       Statins Protocol Failed - 8/2/2022  2:52 PM        Failed - LDL on file in past 12 months     Recent Labs   Lab Test 04/01/21  1024   LDL 83             Passed - No abnormal creatine kinase in past 12 months     No lab results found.             Passed - Recent (12 mo) or future (30 days) visit within the authorizing provider's specialty     Patient has had an office visit with the authorizing provider or a provider within the authorizing providers department within the previous 12 mos or has a future within next 30 days. See \"Patient Info\" tab in inCurrentlyet, or \"Choose Columns\" in Meds & Orders section of the refill encounter.              Passed - Medication is active on med list        Passed - Patient is age 18 or older           ASMANEX  MCG/ACT inhaler [Pharmacy Med Name: ASMANEX  MCG AEROSOL]  0     Sig: INHALE 100 MCG ONE PUFF BY MOUTH TWICE DAILY       Inhaled Steroids Protocol Failed - 8/2/2022  2:52 PM        Failed - Asthma control assessment score within normal limits in last 6 months     Please review ACT score.           Passed - Patient is age 12 or older        Passed - Medication is active on med list        Passed - Recent (6 mo) or future (30 days) visit within the authorizing provider's specialty     Patient had office visit in the last 6 months or has a visit in the next 30 days with authorizing provider or within the authorizing provider's specialty.  See \"Patient Info\" tab in inCurrentlyet, or " "\"Choose Columns\" in Meds & Orders section of the refill encounter.                 Irasema Richardson RN 08/03/22 11:56 AM  "

## 2022-08-03 NOTE — TELEPHONE ENCOUNTER
"Spoke to patient  Informed him of refills and needing to come in to see new provider, do fasting labs, check BP, have visit BEFORE needing more refills  Patient said \"ok, sounds good\"  "

## 2022-08-03 NOTE — TELEPHONE ENCOUNTER
Patient should come in for a visit fasting.  His blood pressure was 183/117 at his visit in April and he is overdue for lab work.  I did send in refills of both medications that should last at least 3 months so he has time to get something set up.

## 2023-01-12 DIAGNOSIS — J45.30 MILD PERSISTENT ASTHMA WITHOUT COMPLICATION: ICD-10-CM

## 2023-01-13 RX ORDER — FLUTICASONE PROPIONATE 110 UG/1
AEROSOL, METERED RESPIRATORY (INHALATION)
Refills: 1 | OUTPATIENT
Start: 2023-01-13

## 2023-01-13 NOTE — TELEPHONE ENCOUNTER
No longer under provider care.    Pending Prescriptions:                       Disp   Refills    fluticasone (FLOVENT HFA) 110 MCG/ACT inh*       1            Sig: INHALE ONE PUFF INTO THE LUNGS TWO TIMES DAILY

## 2023-02-03 DIAGNOSIS — E11.9 TYPE 2 DIABETES MELLITUS WITHOUT COMPLICATION, WITHOUT LONG-TERM CURRENT USE OF INSULIN (H): Primary | ICD-10-CM

## 2023-04-06 DIAGNOSIS — J45.30 MILD PERSISTENT ASTHMA WITHOUT COMPLICATION: Primary | ICD-10-CM

## 2023-04-06 RX ORDER — ALBUTEROL SULFATE 90 UG/1
AEROSOL, METERED RESPIRATORY (INHALATION)
Qty: 18 G | Refills: 0 | Status: SHIPPED | OUTPATIENT
Start: 2023-04-06

## 2023-04-06 NOTE — TELEPHONE ENCOUNTER
RN attempted to call patient.    Received refill request, and after review of chart patient's LOV was 4/26/22 w/Dr. Flor.      Listed PCP is [facility].    RN trying to clarify if patient does indeed need medication filled? Are these now being prescribe by another provider from another system?     If he does need them filled through [facility], patient needs to establish care with a PCP.  Will route for provider to review and advise.       Nannette MIGUEL RN  [facility]

## 2023-04-06 NOTE — TELEPHONE ENCOUNTER
Routing to provider as refill request for review/approval because:      LOV 4/26/22 Dr. Basia MURDOCK attempted to call patient to set up appointment and establish care with new PCP.  Unable to leave a VM, no mailbox set up.        Nannette Muñoz RN  [facility]

## 2024-06-26 NOTE — NURSING NOTE
- appears euvolemic  - renal consult placed for inaptient dialysis management.   Comprehensive Intake Entered On:  6/26/2019 3:58 PM CDT    Performed On:  6/26/2019 3:55 PM CDT by Kaylene Streeter CMA               Summary   Chief Complaint :   1.  f/u labs - chronic  2.  check lesions by eyes - removal    Advance Directive :   Yes   Weight Measured :   177.8 lb(Converted to: 177 lb 13 oz, 80.65 kg)    Systolic Blood Pressure :   100 mmHg   Diastolic Blood Pressure :   72 mmHg   Mean Arterial Pressure :   81 mmHg   Peripheral Pulse Rate :   74 bpm   BP Site :   Right arm   Temperature Tympanic :   97.4 DegF(Converted to: 36.3 DegC)  (LOW)    Kaylene Streeter CMA - 6/26/2019 3:55 PM CDT   Health Status   Allergies Verified? :   Yes   Medication History Verified? :   Yes   Immunizations Current :   Yes   Medical History Verified? :   Yes   Pre-Visit Planning Status :   Completed   Tobacco Use? :   Current every day smoker   Kaylene Streeter CMA - 6/26/2019 3:55 PM CDT   Social History   Social History   (As Of: 6/26/2019 3:58:55 PM CDT)   Alcohol:        Past   (Last Updated: 5/7/2019 12:04:58 PM CDT by Kaylene Streeter CMA)          Tobacco:        Current, Oral   (Last Updated: 7/30/2015 8:44:38 AM CDT by Fabiana Vasquez CMA)

## 2025-02-26 NOTE — NURSING NOTE
Blood Glucose Monitoring POC Entered On:  2/19/2019 9:03 AM CST    Performed On:  2/19/2019 9:03 AM CST by Kaylene Streeter CMA               Blood Glucose Monitoring POC   Blood Glucose Stick Site :   Finger   Blood Glucose :   269 mg/dL   Blood Glucose Interventions :   Notify physician   Kaylene Streeter CMA - 2/19/2019 9:03 AM CST  
Comprehensive Intake Entered On:  2/19/2019 8:43 AM CST    Performed On:  2/19/2019 8:40 AM CST by Kaylene Streeter CMA   Systolic Blood Pressure :   170 mmHg (HI)    Diastolic Blood Pressure :   102 mmHg (HI)    Mean Arterial Pressure :   125 mmHg   Peripheral Pulse Rate :   76 bpm   Temperature Tympanic :   96.9 DegF(Converted to: 36.1 DegC)  (LOW)    Kaylene Streeter CMA - 2/19/2019 8:43 AM CST   Chief Complaint :   Review labs - High blood sugar   Advance Directive :   Yes   Weight Measured :   177.4 lb(Converted to: 177 lb 6 oz, 80.47 kg)    Height Measured :   70.5 in(Converted to: 5 ft 10 in, 179.07 cm)    Body Mass Index :   25.09 kg/m2 (HI)    Body Surface Area :   2 m2   Kaylene Streeter CMA - 2/19/2019 8:40 AM CST   Health Status   Allergies Verified? :   Yes   Medication History Verified? :   Yes   Immunizations Current :   Yes   Medical History Verified? :   Yes   Pre-Visit Planning Status :   Completed   Tobacco Use? :   Current every day smoker   Kaylene Streeter CMA - 2/19/2019 8:40 AM CST   Family History   Family History   (As Of: 2/19/2019 8:43:06 AM CST)   Grandfather (M):   Relation:   Grandfather (M) ;           Nomenclature:   Suicide ; Value:   Positive            Nomenclature:   Alcohol abuse ; Value:   Positive          Mother:   Relation:   Mother ; Gender:   Female ;           Nomenclature:   High blood pressure ; Value:   Positive            Social History   Social History   (As Of: 2/19/2019 8:43:06 AM CST)   Tobacco:        Current, Oral   (Last Updated: 7/30/2015 8:44:38 AM CDT by Fabiana Vasquez CMA)            More Vitals   Systolic Blood Pressure Repeat :   160 mmHg   Diastolic Blood Pressure Repeat :   101 mmHg   Kaylene Streeter CMA - 2/19/2019 9:09 AM CST  
Yes